# Patient Record
Sex: FEMALE | Race: WHITE | NOT HISPANIC OR LATINO | Employment: FULL TIME | URBAN - METROPOLITAN AREA
[De-identification: names, ages, dates, MRNs, and addresses within clinical notes are randomized per-mention and may not be internally consistent; named-entity substitution may affect disease eponyms.]

---

## 2017-05-12 ENCOUNTER — TRANSCRIBE ORDERS (OUTPATIENT)
Dept: LAB | Facility: HOSPITAL | Age: 52
End: 2017-05-12

## 2017-05-12 ENCOUNTER — APPOINTMENT (OUTPATIENT)
Dept: LAB | Facility: HOSPITAL | Age: 52
End: 2017-05-12
Attending: FAMILY MEDICINE
Payer: COMMERCIAL

## 2017-05-12 DIAGNOSIS — I10 ESSENTIAL HYPERTENSION, BENIGN: Primary | ICD-10-CM

## 2017-05-12 DIAGNOSIS — I10 ESSENTIAL HYPERTENSION, BENIGN: ICD-10-CM

## 2017-05-12 LAB
ALBUMIN SERPL BCP-MCNC: 3.8 G/DL (ref 3.5–5)
ALP SERPL-CCNC: 109 U/L (ref 46–116)
ALT SERPL W P-5'-P-CCNC: 11 U/L (ref 12–78)
ANION GAP SERPL CALCULATED.3IONS-SCNC: 6 MMOL/L (ref 4–13)
AST SERPL W P-5'-P-CCNC: 9 U/L (ref 5–45)
BILIRUB SERPL-MCNC: 0.46 MG/DL (ref 0.2–1)
BUN SERPL-MCNC: 16 MG/DL (ref 5–25)
CALCIUM SERPL-MCNC: 8.9 MG/DL (ref 8.3–10.1)
CHLORIDE SERPL-SCNC: 105 MMOL/L (ref 100–108)
CHOLEST SERPL-MCNC: 171 MG/DL (ref 50–200)
CO2 SERPL-SCNC: 27 MMOL/L (ref 21–32)
CREAT SERPL-MCNC: 0.7 MG/DL (ref 0.6–1.3)
ERYTHROCYTE [DISTWIDTH] IN BLOOD BY AUTOMATED COUNT: 12.8 % (ref 11.6–15.1)
GFR SERPL CREATININE-BSD FRML MDRD: >60 ML/MIN/1.73SQ M
GLUCOSE P FAST SERPL-MCNC: 113 MG/DL (ref 65–99)
HCT VFR BLD AUTO: 40.5 % (ref 34.8–46.1)
HDLC SERPL-MCNC: 59 MG/DL (ref 40–60)
HGB BLD-MCNC: 13.8 G/DL (ref 11.5–15.4)
LDLC SERPL CALC-MCNC: 90 MG/DL (ref 0–100)
MCH RBC QN AUTO: 27.7 PG (ref 26.8–34.3)
MCHC RBC AUTO-ENTMCNC: 34.1 G/DL (ref 31.4–37.4)
MCV RBC AUTO: 81 FL (ref 82–98)
PLATELET # BLD AUTO: 258 THOUSANDS/UL (ref 149–390)
PMV BLD AUTO: 10.9 FL (ref 8.9–12.7)
POTASSIUM SERPL-SCNC: 3.3 MMOL/L (ref 3.5–5.3)
PROT SERPL-MCNC: 7.5 G/DL (ref 6.4–8.2)
RBC # BLD AUTO: 4.99 MILLION/UL (ref 3.81–5.12)
SODIUM SERPL-SCNC: 138 MMOL/L (ref 136–145)
TRIGL SERPL-MCNC: 112 MG/DL
TSH SERPL DL<=0.05 MIU/L-ACNC: 1 UIU/ML (ref 0.36–3.74)
WBC # BLD AUTO: 6.26 THOUSAND/UL (ref 4.31–10.16)

## 2017-05-12 PROCEDURE — 85027 COMPLETE CBC AUTOMATED: CPT

## 2017-05-12 PROCEDURE — 80053 COMPREHEN METABOLIC PANEL: CPT

## 2017-05-12 PROCEDURE — 80061 LIPID PANEL: CPT

## 2017-05-12 PROCEDURE — 36415 COLL VENOUS BLD VENIPUNCTURE: CPT

## 2017-05-12 PROCEDURE — 84443 ASSAY THYROID STIM HORMONE: CPT

## 2017-05-14 ENCOUNTER — GENERIC CONVERSION - ENCOUNTER (OUTPATIENT)
Dept: OTHER | Facility: OTHER | Age: 52
End: 2017-05-14

## 2017-05-17 ENCOUNTER — ALLSCRIPTS OFFICE VISIT (OUTPATIENT)
Dept: OTHER | Facility: OTHER | Age: 52
End: 2017-05-17

## 2017-05-17 DIAGNOSIS — Z12.11 ENCOUNTER FOR SCREENING FOR MALIGNANT NEOPLASM OF COLON: ICD-10-CM

## 2017-05-17 DIAGNOSIS — R73.01 IMPAIRED FASTING GLUCOSE: ICD-10-CM

## 2018-01-09 NOTE — PROGRESS NOTES
Assessment    1  Encounter for mammogram to establish baseline mammogram (V76 12) (Z12 31)   2  Encounter for preventive health examination (V70 0) (Z00 00)   3  Hypertension (401 9) (I10)    Plan  Encounter for mammogram to establish baseline mammogram    · * MAMMO SCREENING BILATERAL W CAD; Status:Hold For - Scheduling; Requested  for:50Bqx0710;   Hypertension    · (Q) CBC (H/H, RBC, INDICES, WBC, PLT); Status:Active; Requested for:02Cyq1974;    · (Q) COMPREHENSIVE METABOLIC PNL W/ADJUSTED CALCIUM; Status:Active; Requested for:16Euf1235;    · (Q) LIPID PANEL WITH REFLEX TO DIRECT LDL; Status:Active; Requested  for:42Rdm0941;    · (Q) TSH, 3RD GENERATION; Status:Active; Requested for:56Qhh7612;     Discussion/Summary  health maintenance visit Currently, she eats a healthy diet and Not exercising  pap smear 3 years ago normal as per patient Breast cancer screening: the risks and benefits of breast cancer screening were discussed and mammogram has been ordered  Screening lab work includes hemoglobin, glucose, lipid profile and thyroid function testing  Advice and education were given regarding nutrition, aerobic exercise, weight loss, calcium supplements and vitamin D supplements  Patient discussion: discussed with the patient  53 yo female   HM:  - For breast cancer screening: Mammogram ordered  - BMI: 42 2: Patient counselled to reduce weight, eat healthy diet and do exercise  - Decreased Vision: Seeing her Opthalmology doctor  Hypertension:  - BP well controlled with Hydrochlorothiazide 12 5 mg oral once a day and Metoprolol Succinate ER 50 mg oral once a day  - Ordered CBC, CMP, lipid panel and TSH  - Will follow up in 1- 2 weeks for lab results  Possible side effects of new medications were reviewed with the patient/guardian today  The patient was counseled regarding instructions for management, risk factor reductions, importance of compliance with treatment       Self Referrals: No      Chief Complaint  Health maintenance      History of Present Illness  HM, Adult Female: The patient is being seen for a health maintenance evaluation  The last health maintenance visit was 1 year(s) ago  General Health: The patient's health since the last visit is described as good  She has regular dental visits  She complains of vision problems  She denies hearing loss  Lifestyle:  She does not exercise regularly  She does not use tobacco  She denies alcohol use  She denies drug use  Reproductive health:  she is not sexually active  LMP Aug 2,2016  Regular periods, No children  Screening:   HPI: 55 yo female came for trevon maintenance  She has no health concerns  She reported that she eats healthy diet, limited her soda intake, do not do exercises  Review of Systems    Constitutional: no fever, no chills and not feeling tired  Eyes: eyesight problems and Decreased vision, wears glasses  , but no eye pain and eyes not red  ENT: no earache, no nosebleeds, no sore throat, no hearing loss, no nasal discharge and no hoarseness  Cardiovascular: No complaints of slow heart rate, no fast heart rate, no chest pain, no palpitations, no leg claudication, no lower extremity edema  Respiratory: No complaints of shortness of breath, no wheezing, no cough, no SOB on exertion, no orthopnea, no PND  Gastrointestinal: No complaints of abdominal pain, no constipation, no nausea or vomiting, no diarrhea, no bloody stools  Genitourinary: No complaints of dysuria, no incontinence, no pelvic pain, no dysmenorrhea, no vaginal discharge or bleeding  Integumentary: No complaints of skin rash or lesions, no itching, no skin wounds, no breast pain or lump  Neurological: No complaints of headache, no confusion, no convulsions, no numbness, no dizziness or fainting, no tingling, no limb weakness, no difficulty walking  Psychiatric: no anxiety, no sleep disturbances, no depression and no emotional problems     Endocrine: No complaints of proptosis, no hot flashes, no muscle weakness, no deepening of the voice, no feelings of weakness  Hematologic/Lymphatic: No complaints of swollen glands, no swollen glands in the neck, does not bleed easily, does not bruise easily  Active Problems    1  Allergic rhinitis (477 9) (J30 9)   2  Cerumen impaction (380 4) (H61 20)   3  Hearing loss due to cerumen impaction (389 8,380 4) (H61 20)   4  Hypertension (401 9) (I10)   5  Obesity (278 00) (E66 9)   6  Pain, ear (388 70) (H92 09)   7  Right otitis media with effusion (381 4) (H65 91)   8  Screening for breast cancer (V76 10) (Z12 39)    Surgical History    · History of Knee Surgery    Family History  Mother    · Family history of Diabetes Mellitus (V18 0)  Father    · Family history of Hypertension (V17 49)    Social History    · Being A Social Drinker   · Never A Smoker   · Uses Safety Equipment - Seatbelts    Current Meds   1  Debrox 6 5 % Otic Solution; Instill 5-10 drops twice daily for up to 4 days; Therapy: 01Apr2015 to (Last Rx:01Apr2015)  Requested for: 21Cca1308 Ordered   2  Fluticasone Propionate 50 MCG/ACT Nasal Suspension; USE 2 SPRAYS IN EACH   NOSTRIL ONCE DAILY; Therapy: 45YTV8900 to (Last Rx:78Dfn2955)  Requested for: 51ABV4661 Ordered   3  Hydrochlorothiazide 12 5 MG Oral Tablet; TAKE 1 TABLET DAILY IN THE MORNING; Therapy: 33LLJ7521 to (96 [id number])  Requested for: 40JYQ1049; Last   Rx:23Jan2013 Ordered   4  Hydrochlorothiazide 12 5 MG Oral Tablet; TAKE 1 TABLET EVERY MORNING; Therapy: 45QJA9913 to (Last Rx:20Bia0659)  Requested for: 03Yqh0451 Ordered   5  Metoprolol Succinate ER 50 MG Oral Tablet Extended Release 24 Hour; TAKE 1 TABLET   EVERY MORNING; Therapy: 32EZK0535 to (Last Rx:39Deq5106)  Requested for: 34YLI8499 Ordered   6  Ofloxacin 0 3 % Otic Solution; instill 5 drops into the RIGHT ear 4 times daily for 5 days;    Therapy: 01Apr2015 to (Last Rx:01Apr2015)  Requested for: 01Apr2015 Ordered    Allergies    1  Azithromycin TABS   2  Penicillins    Vitals   Recorded: 43SHT2930 44:37XC   Systolic 420   Diastolic 86   Heart Rate 80   Respiration 16   Temperature 97 7 F   Height 5 ft 3 4 in   Weight 241 lb 4 oz   BMI Calculated 42 2   BSA Calculated 2 1     Physical Exam    Constitutional   General appearance: No acute distress, well appearing and well nourished  Head and Face   Head and face: Normal     Palpation of the face and sinuses: No sinus tenderness  Eyes   Conjunctiva and lids: No swelling, erythema or discharge  Pupils and irises: Equal, round, reactive to light  Ears, Nose, Mouth, and Throat   External inspection of ears and nose: Normal     Nasal mucosa, septum, and turbinates: Normal without edema or erythema  Oropharynx: Normal with no erythema, edema, exudate or lesions  Neck   Neck: Supple, symmetric, trachea midline, no masses  Pulmonary   Respiratory effort: No increased work of breathing or signs of respiratory distress  Auscultation of lungs: Clear to auscultation  Cardiovascular   Auscultation of heart: Normal rate and rhythm, normal S1 and S2, no murmurs  Examination of extremities for edema and/or varicosities: Normal     Abdomen   Abdomen: Non-tender, no masses  Liver and spleen: No hepatomegaly or splenomegaly  Musculoskeletal   Gait and station: Normal     Digits and nails: Normal without clubbing or cyanosis  Skin   Skin and subcutaneous tissue: Normal without rashes or lesions  Neurologic   Cranial nerves: Cranial nerves II-XII intact  Coordination: Normal finger to nose and heel to shin  Psychiatric   Judgment and insight: Normal     Orientation to person, place, and time: Normal     Recent and remote memory: Intact      Mood and affect: Normal        Attending Note  Attending Note: Attending Note: I discussed the case with the Resident and reviewed the Resident's note, I supervised the Resident and I agree with the Resident management plan as it was presented to me  Level of Participation: I was present in clinic and examined the patient  Comments/Additional Findings: HTN: well controlled, agree with labs, TLC, medications  RHM: mammogram  I agree with the Resident's note  Future Appointments    Date/Time Provider Specialty Site   09/28/2016 04:00 PM SEDRICK Carballo   [id number] [facility]     Signatures   Electronically signed by : SEDRICK Yañez ; Aug 10 2016  7:34PM EST                       (Author)    Electronically signed by : SEDRICK Jones ; Aug 11 2016  2:04PM EST                       (Author)

## 2018-01-10 NOTE — RESULT NOTES
Verified Results  (1) CBC/ PLT (NO DIFF) 11EGI9940 08:39AM Miguelito Brooks     Test Name Result Flag Reference   HEMATOCRIT 40 5 %  34 8-46 1   HEMOGLOBIN 13 8 g/dL  11 5-15 4   MCHC 34 1 g/dL  31 4-37 4   MCH 27 7 pg  26 8-34 3   MCV 81 fL L 82-98   PLATELET COUNT 270 Thousands/uL  149-390   RBC COUNT 4 99 Million/uL  3 81-5 12   RDW 12 8 %  11 6-15 1   WBC COUNT 6 26 Thousand/uL  4 31-10 16   MPV 10 9 fL  8 9-12 7     (1) COMPREHENSIVE METABOLIC PANEL 60BFQ3041 45:92TE Miguelito Brooks     Test Name Result Flag Reference   SODIUM 138 mmol/L  136-145   POTASSIUM 3 3 mmol/L L 3 5-5 3   CHLORIDE 105 mmol/L  100-108   CARBON DIOXIDE 27 mmol/L  21-32   ANION GAP (CALC) 6 mmol/L  4-13   BLOOD UREA NITROGEN 16 mg/dL  5-25   CREATININE 0 70 mg/dL  0 60-1 30   Standardized to IDMS reference method   CALCIUM 8 9 mg/dL  8 3-10 1   BILI, TOTAL 0 46 mg/dL  0 20-1 00   ALK PHOSPHATAS 109 U/L     ALT (SGPT) 11 U/L L 12-78   AST(SGOT) 9 U/L  5-45   ALBUMIN 3 8 g/dL  3 5-5 0   TOTAL PROTEIN 7 5 g/dL  6 4-8 2   eGFR Non-African American      >60 0 ml/min/1 73sq [facility] Disease Education Program recommendations are as follows:  GFR calculation is accurate only with a steady state creatinine  Chronic Kidney disease less than 60 ml/min/1 73 sq  meters  Kidney failure less than 15 ml/min/1 73 sq  meters  GLUCOSE FASTING 113 mg/dL H 65-99     (1) TSH 97SAQ2469 08:39AM Miguelito Brooks     Test Name Result Flag Reference   TSH 1 000 uIU/mL  0 358-3 740   This bloodwork is non-fasting  Please drink two glasses of water morning of  bloodwork  Patients undergoing fluorescein dye angiography may retain small amounts of fluorescein in the body for 48-72 hours post procedure  Samples containing fluorescein can produce falsely depressed TSH values  If the patient had this procedure,a specimen should be resubmitted post fluorescein clearance            The recommended reference ranges for TSH during pregnancy are as follows:  First trimester 0 1 to 2 5 uIU/mL  Second trimester  0 2 to 3 0 uIU/mL  Third trimester 0 3 to 3 0 uIU/m     (1) LIPID PANEL FASTING W DIRECT LDL REFLEX 12EYF1299 08:39AM Za Steele     Test Name Result Flag Reference   CHOLESTEROL 171 mg/dL     LDL CHOLESTEROL CALCULATED 90 mg/dL  0-100   This is a fasting blood test  Water,black tea or black  coffee only after 9:00pm the night before test  Drink 2 glasses of water the morning of test         Triglyceride:         Normal              <150 mg/dl       Borderline High    150-199 mg/dl       High               200-499 mg/dl       Very High          >499 mg/dl  Cholesterol:         Desirable        <200 mg/dl      Borderline High  200-239 mg/dl      High             >239 mg/dl  HDL Cholesterol:        High    >59 mg/dL      Low     <41 mg/dL  LDL Cholesterol:        Optimal          <100 mg/dl        Near Optimal     100-129 mg/dl        Above Optimal          Borderline High   130-159 mg/dl          High              160-189 mg/dl          Very High        >189 mg/dl  LDL CALCULATED:    This screening LDL is a calculated result  It does not have the accuracy of the Direct Measured LDL in the monitoring of patients with hyperlipidemia and/or statin therapy  Direct Measure LDL (RGB493) must be ordered separately in these patients  TRIGLYCERIDES 112 mg/dL  <=150   Specimen collection should occur prior to N-Acetylcysteine or Metamizole administration due to the potential for falsely depressed results  HDL,DIRECT 59 mg/dL  40-60   Specimen collection should occur prior to Metamizole administration due to the potential for falsely depressed results

## 2018-01-13 VITALS
HEIGHT: 63 IN | WEIGHT: 239.38 LBS | SYSTOLIC BLOOD PRESSURE: 124 MMHG | BODY MASS INDEX: 42.41 KG/M2 | HEART RATE: 84 BPM | RESPIRATION RATE: 14 BRPM | DIASTOLIC BLOOD PRESSURE: 80 MMHG | TEMPERATURE: 97 F

## 2018-02-05 DIAGNOSIS — I10 HYPERTENSION, UNSPECIFIED TYPE: Primary | ICD-10-CM

## 2018-02-05 RX ORDER — METOPROLOL SUCCINATE 50 MG/1
1 TABLET, EXTENDED RELEASE ORAL DAILY
COMMUNITY
Start: 2011-06-22 | End: 2018-02-05 | Stop reason: SDUPTHER

## 2018-02-05 RX ORDER — METOPROLOL SUCCINATE 50 MG/1
TABLET, EXTENDED RELEASE ORAL
Qty: 90 TABLET | Refills: 0 | OUTPATIENT
Start: 2018-02-05

## 2018-02-05 RX ORDER — METOPROLOL SUCCINATE 50 MG/1
50 TABLET, EXTENDED RELEASE ORAL DAILY
Qty: 30 TABLET | Refills: 0 | Status: SHIPPED | OUTPATIENT
Start: 2018-02-05 | End: 2018-05-15 | Stop reason: SDUPTHER

## 2018-02-05 NOTE — TELEPHONE ENCOUNTER
Received request for refill of pt's Metoprolol  Will send a one month supply, but pt needs to set up an appointment, as she has not been seen in over 6 months

## 2018-02-06 NOTE — TELEPHONE ENCOUNTER
Left patient a message letting her know refill was sent to pharmacy and asked her to call to schedule appointment

## 2018-05-14 PROBLEM — R73.01 IMPAIRED FASTING GLUCOSE: Status: ACTIVE | Noted: 2017-05-17

## 2018-05-14 RX ORDER — HYDROCHLOROTHIAZIDE 12.5 MG/1
1 TABLET ORAL DAILY
COMMUNITY
Start: 2011-11-21 | End: 2018-05-14 | Stop reason: SDUPTHER

## 2018-05-14 RX ORDER — HYDROCHLOROTHIAZIDE 12.5 MG/1
TABLET ORAL
Qty: 90 TABLET | Refills: 3 | OUTPATIENT
Start: 2018-05-14

## 2018-05-14 NOTE — TELEPHONE ENCOUNTER
Patient has not been seen in our office in a year  Please have her schedule visit  If she is completely out of Rx please let me know and I will provide 30 day supply

## 2018-05-14 NOTE — PROGRESS NOTES
Afia Caban 1965 female MRN: [id number]    Family Medicine Follow-up Visit      SUBJECTIVE    CC: Hypertension    HPI:  Afia Caban is a 48 y o  female who presented for a follow-up of Hypertension   This is a chronic problem  The current episode started more than 1 year ago  The problem is unchanged  The problem is controlled  Pertinent negatives include no anxiety, blurred vision, chest pain, headaches, malaise/fatigue, orthopnea, palpitations, peripheral edema, shortness of breath or sweats  There are no associated agents to hypertension  Risk factors for coronary artery disease include obesity and sedentary lifestyle  Past treatments include diuretics and beta blockers  The current treatment provides significant improvement  Compliance problems include diet and exercise  There is no history of angina, kidney disease or heart failure  Never mammo, Had PAP never abnormal     Review of Systems   Constitutional: Negative for activity change, chills, fever and malaise/fatigue  HENT: Negative for congestion, rhinorrhea and sore throat  Eyes: Negative for blurred vision and visual disturbance  Respiratory: Negative for cough, shortness of breath and wheezing  Cardiovascular: Negative for chest pain, palpitations and orthopnea  Gastrointestinal: Negative for abdominal pain, blood in stool, constipation, diarrhea, nausea and vomiting  Genitourinary: Negative for dysuria  Musculoskeletal: Negative for arthralgias and myalgias  Skin: Negative for rash  Neurological: Negative for dizziness, weakness and headaches  All other systems reviewed and are negative  Historical Information     The patient history was reviewed as follows:    No past medical history on file    Past Surgical History:   Procedure Laterality Date    KNEE SURGERY       Family History   Problem Relation Age of Onset    Diabetes Mother      Mellitus    Hypertension Father       Social History   History   Alcohol Use  Yes     Comment: Social     History   Drug use: Unknown     History   Smoking Status    Never Smoker   Smokeless Tobacco    Not on file     Medications:   Meds/Allergies     Current Outpatient Prescriptions:     hydrochlorothiazide (HYDRODIURIL) 12 5 mg tablet, Take 1 tablet (12 5 mg total) by mouth daily, Disp: 90 tablet, Rfl: 3    metoprolol succinate (TOPROL-XL) 50 mg 24 hr tablet, Take 1 tablet (50 mg total) by mouth daily, Disp: 90 tablet, Rfl: 3  Allergies   Allergen Reactions    Azithromycin     Penicillins        OBJECTIVE    Vitals:   Vitals:    05/15/18 1443   BP: 126/84   Pulse: 84   Resp: 16   Temp: 97 7 °F (36 5 °C)   Weight: 109 kg (240 lb 9 6 oz)   Height: 5' 4 4" (1 636 m)     Wt Readings from Last 3 Encounters:   05/15/18 109 kg (240 lb 9 6 oz)   05/17/17 109 kg (239 lb 6 oz)   08/10/16 109 kg (241 lb 4 oz)     Body mass index is 40 79 kg/m²  Temp Readings from Last 3 Encounters:   05/15/18 97 7 °F (36 5 °C)   05/17/17 (!) 97 °F (36 1 °C)   08/10/16 97 7 °F (36 5 °C)     BP Readings from Last 3 Encounters:   05/15/18 126/84   05/17/17 124/80   08/10/16 130/86     Pulse Readings from Last 3 Encounters:   05/15/18 84   05/17/17 84   08/10/16 80     No LMP recorded  Physical Exam:    Physical Exam   Constitutional: She appears well-developed and well-nourished  HENT:   Head: Normocephalic and atraumatic  Eyes: Conjunctivae and EOM are normal    Neck: No thyromegaly present  Cardiovascular: Normal rate, regular rhythm, normal heart sounds and intact distal pulses  No murmur heard  Pulmonary/Chest: Effort normal and breath sounds normal  No respiratory distress  She has no wheezes  Abdominal: Soft  Bowel sounds are normal  She exhibits no distension  There is no tenderness  Neurological: She is alert  Skin: Skin is warm and dry  Psychiatric: She has a normal mood and affect  Her behavior is normal    Nursing note and vitals reviewed  Labs:   I have personally reviewed all pertinent results  Imaging:  I have personally reviewed all pertinent results  Assessment/Plan   Hypertension  At goal <150/90  Continue HCTZ 12 5mg QD, metoprolol succinate 50mg QD    Marvin Quintero was seen today for hypertension  Diagnoses and all orders for this visit:    Essential hypertension  -     Discontinue: hydrochlorothiazide (HYDRODIURIL) 12 5 mg tablet; Take 1 tablet (12 5 mg total) by mouth daily  -     hydrochlorothiazide (HYDRODIURIL) 12 5 mg tablet; Take 1 tablet (12 5 mg total) by mouth daily    Breast cancer screening by mammogram  -     Mammo screening bilateral w cad; Future    Hypertension, unspecified type  -     metoprolol succinate (TOPROL-XL) 50 mg 24 hr tablet; Take 1 tablet (50 mg total) by mouth daily    Screening for colon cancer  -     Ambulatory referral to Gastroenterology; Future      Patient defers health maintenance until January when she can take off from work  Will do blood work, PAP, mammo, colonoscopy at that time  - PCP: Lisandra Canseco MD  - Follow-up appointments:     No future appointments      _____________________________________________________________________   The attending physician, Dr Tai Rodriguez, agreed with the plan      Dorothy Rainey MD, PGY-2  [facility] Medicine   5/15/2018

## 2018-05-15 ENCOUNTER — OFFICE VISIT (OUTPATIENT)
Dept: FAMILY MEDICINE CLINIC | Facility: CLINIC | Age: 53
End: 2018-05-15
Payer: COMMERCIAL

## 2018-05-15 VITALS
SYSTOLIC BLOOD PRESSURE: 126 MMHG | WEIGHT: 240.6 LBS | RESPIRATION RATE: 16 BRPM | HEART RATE: 84 BPM | TEMPERATURE: 97.7 F | HEIGHT: 64 IN | DIASTOLIC BLOOD PRESSURE: 84 MMHG | BODY MASS INDEX: 41.07 KG/M2

## 2018-05-15 DIAGNOSIS — I10 HYPERTENSION, UNSPECIFIED TYPE: ICD-10-CM

## 2018-05-15 DIAGNOSIS — Z12.11 SCREENING FOR COLON CANCER: ICD-10-CM

## 2018-05-15 DIAGNOSIS — Z12.31 BREAST CANCER SCREENING BY MAMMOGRAM: ICD-10-CM

## 2018-05-15 DIAGNOSIS — I10 ESSENTIAL HYPERTENSION: Primary | ICD-10-CM

## 2018-05-15 PROCEDURE — 3074F SYST BP LT 130 MM HG: CPT | Performed by: FAMILY MEDICINE

## 2018-05-15 PROCEDURE — 99213 OFFICE O/P EST LOW 20 MIN: CPT | Performed by: FAMILY MEDICINE

## 2018-05-15 PROCEDURE — 3008F BODY MASS INDEX DOCD: CPT | Performed by: FAMILY MEDICINE

## 2018-05-15 PROCEDURE — 3079F DIAST BP 80-89 MM HG: CPT | Performed by: FAMILY MEDICINE

## 2018-05-15 RX ORDER — HYDROCHLOROTHIAZIDE 12.5 MG/1
12.5 TABLET ORAL DAILY
Qty: 90 TABLET | Refills: 3 | Status: SHIPPED | OUTPATIENT
Start: 2018-05-15 | End: 2018-05-15 | Stop reason: SDUPTHER

## 2018-05-15 RX ORDER — HYDROCHLOROTHIAZIDE 12.5 MG/1
12.5 TABLET ORAL DAILY
Qty: 90 TABLET | Refills: 3 | Status: SHIPPED | OUTPATIENT
Start: 2018-05-15 | End: 2018-05-16 | Stop reason: SDUPTHER

## 2018-05-15 RX ORDER — METOPROLOL SUCCINATE 50 MG/1
50 TABLET, EXTENDED RELEASE ORAL DAILY
Qty: 90 TABLET | Refills: 3 | Status: SHIPPED | OUTPATIENT
Start: 2018-05-15 | End: 2018-05-16 | Stop reason: SDUPTHER

## 2018-05-15 NOTE — PATIENT INSTRUCTIONS

## 2018-05-16 DIAGNOSIS — I10 ESSENTIAL HYPERTENSION: ICD-10-CM

## 2018-05-16 DIAGNOSIS — I10 HYPERTENSION, UNSPECIFIED TYPE: ICD-10-CM

## 2018-05-16 RX ORDER — METOPROLOL SUCCINATE 50 MG/1
50 TABLET, EXTENDED RELEASE ORAL DAILY
Qty: 90 TABLET | Refills: 1 | Status: SHIPPED | OUTPATIENT
Start: 2018-05-16 | End: 2019-06-11 | Stop reason: SDUPTHER

## 2018-05-16 RX ORDER — HYDROCHLOROTHIAZIDE 12.5 MG/1
12.5 TABLET ORAL DAILY
Qty: 90 TABLET | Refills: 1 | Status: SHIPPED | OUTPATIENT
Start: 2018-05-16 | End: 2019-06-11 | Stop reason: SDUPTHER

## 2019-02-21 ENCOUNTER — TELEPHONE (OUTPATIENT)
Dept: FAMILY MEDICINE CLINIC | Facility: CLINIC | Age: 54
End: 2019-02-21

## 2019-02-21 NOTE — TELEPHONE ENCOUNTER
Patient was last seen 5/15/2018 and was to follow up in 6 months for a pap smear  Please schedule appointment with Dr Kathy Bunn  Thank you

## 2019-05-12 DIAGNOSIS — I10 ESSENTIAL HYPERTENSION: ICD-10-CM

## 2019-05-12 DIAGNOSIS — I10 HYPERTENSION, UNSPECIFIED TYPE: ICD-10-CM

## 2019-05-13 RX ORDER — HYDROCHLOROTHIAZIDE 12.5 MG/1
TABLET ORAL
Qty: 30 TABLET | Refills: 0 | Status: SHIPPED | OUTPATIENT
Start: 2019-05-13 | End: 2019-06-11 | Stop reason: SDUPTHER

## 2019-05-13 RX ORDER — METOPROLOL SUCCINATE 50 MG/1
TABLET, EXTENDED RELEASE ORAL
Qty: 30 TABLET | Refills: 0 | Status: SHIPPED | OUTPATIENT
Start: 2019-05-13 | End: 2019-06-11 | Stop reason: SDUPTHER

## 2019-06-10 ENCOUNTER — OFFICE VISIT (OUTPATIENT)
Dept: FAMILY MEDICINE CLINIC | Facility: CLINIC | Age: 54
End: 2019-06-10

## 2019-06-10 VITALS
DIASTOLIC BLOOD PRESSURE: 92 MMHG | SYSTOLIC BLOOD PRESSURE: 140 MMHG | RESPIRATION RATE: 18 BRPM | BODY MASS INDEX: 44.54 KG/M2 | WEIGHT: 251.4 LBS | TEMPERATURE: 97.8 F | HEIGHT: 63 IN | HEART RATE: 76 BPM

## 2019-06-10 DIAGNOSIS — R73.01 IMPAIRED FASTING GLUCOSE: ICD-10-CM

## 2019-06-10 DIAGNOSIS — Z12.12 SCREENING FOR COLORECTAL CANCER: ICD-10-CM

## 2019-06-10 DIAGNOSIS — E66.01 MORBID OBESITY WITH BMI OF 40.0-44.9, ADULT (HCC): ICD-10-CM

## 2019-06-10 DIAGNOSIS — I10 ESSENTIAL HYPERTENSION: ICD-10-CM

## 2019-06-10 DIAGNOSIS — Z11.4 SCREENING FOR HIV (HUMAN IMMUNODEFICIENCY VIRUS): ICD-10-CM

## 2019-06-10 DIAGNOSIS — Z11.59 NEED FOR HEPATITIS C SCREENING TEST: ICD-10-CM

## 2019-06-10 DIAGNOSIS — Z12.31 ENCOUNTER FOR SCREENING MAMMOGRAM FOR BREAST CANCER: ICD-10-CM

## 2019-06-10 DIAGNOSIS — Z12.11 SCREENING FOR COLORECTAL CANCER: ICD-10-CM

## 2019-06-10 DIAGNOSIS — Z00.00 ANNUAL PHYSICAL EXAM: Primary | ICD-10-CM

## 2019-06-10 PROCEDURE — 99396 PREV VISIT EST AGE 40-64: CPT | Performed by: FAMILY MEDICINE

## 2019-06-11 ENCOUNTER — TELEPHONE (OUTPATIENT)
Dept: FAMILY MEDICINE CLINIC | Facility: CLINIC | Age: 54
End: 2019-06-11

## 2019-06-11 DIAGNOSIS — I10 ESSENTIAL HYPERTENSION: ICD-10-CM

## 2019-06-11 DIAGNOSIS — I10 HYPERTENSION, UNSPECIFIED TYPE: ICD-10-CM

## 2019-06-11 RX ORDER — METOPROLOL SUCCINATE 50 MG/1
50 TABLET, EXTENDED RELEASE ORAL DAILY
Qty: 90 TABLET | Refills: 3 | Status: SHIPPED | OUTPATIENT
Start: 2019-06-11 | End: 2020-06-05

## 2019-06-11 RX ORDER — HYDROCHLOROTHIAZIDE 12.5 MG/1
12.5 TABLET ORAL DAILY
Qty: 90 TABLET | Refills: 3 | Status: SHIPPED | OUTPATIENT
Start: 2019-06-11 | End: 2020-06-05

## 2020-06-05 DIAGNOSIS — I10 ESSENTIAL HYPERTENSION: ICD-10-CM

## 2020-06-05 DIAGNOSIS — I10 HYPERTENSION, UNSPECIFIED TYPE: ICD-10-CM

## 2020-06-05 RX ORDER — HYDROCHLOROTHIAZIDE 12.5 MG/1
TABLET ORAL
Qty: 90 TABLET | Refills: 0 | Status: SHIPPED | OUTPATIENT
Start: 2020-06-05 | End: 2020-09-03

## 2020-06-05 RX ORDER — METOPROLOL SUCCINATE 50 MG/1
TABLET, EXTENDED RELEASE ORAL
Qty: 90 TABLET | Refills: 0 | Status: SHIPPED | OUTPATIENT
Start: 2020-06-05 | End: 2020-09-03

## 2020-09-03 DIAGNOSIS — I10 HYPERTENSION, UNSPECIFIED TYPE: ICD-10-CM

## 2020-09-03 DIAGNOSIS — I10 ESSENTIAL HYPERTENSION: ICD-10-CM

## 2020-09-03 RX ORDER — HYDROCHLOROTHIAZIDE 12.5 MG/1
12.5 TABLET ORAL DAILY
Qty: 90 TABLET | Refills: 0 | Status: SHIPPED | OUTPATIENT
Start: 2020-09-03 | End: 2020-12-02

## 2020-09-03 RX ORDER — METOPROLOL SUCCINATE 50 MG/1
50 TABLET, EXTENDED RELEASE ORAL DAILY
Qty: 90 TABLET | Refills: 0 | Status: SHIPPED | OUTPATIENT
Start: 2020-09-03 | End: 2020-12-02

## 2020-09-11 NOTE — PROGRESS NOTES
Assessment/Plan:    Hypertension  - Essential HTN  - BP today 150/102, repeat 150/96  - Home meds: HCTZ 12 5, Toprol XL 50 mg daily  - Body mass index is 45 53 kg/m²  - Last basic metabolic panel , urine protein/ microalbumin: from 2017, K 3 3 at that time, sCr wnl,no microalbumin ratio   - Counseled about diet and salt intake, encouraged exercise daily  - Discussed potential need to change her HCTZ or add potassium supplementation if her K+ level is still low  - Pt will add more potassium rich foods into her diet over the next couple of weeks  - Will order BMP and follow-up in 2 weeks to go over blood work and perform her annual physical exam      Leg cramping  Chronic, worsening  - Pt reports h/o leg cramps, getting worse now and she has been working more in the kitchen lately  - Cramping occurs nightly and sometimes after sitting for prolonged periods of time  - Could be 2/2 dehydration vs electrolyte disturbances from HCTZ use vs prolonged muscle use from standing  - Will check Bmp and Magnesium level    - Follow-up in 2 weeks at annual physical exam    Morbid obesity with BMI of 40 0-44 9, adult ([facility] [address] )  BMI Counseling: Body mass index is 45 53 kg/m²  The BMI is above normal  Nutrition recommendations include reducing portion sizes, decreasing overall calorie intake and 3-5 servings of fruits/vegetables daily  Exercise recommendations include exercising 3-5 times per week  - We discussed increasing walking on her days off to 1-2 miles or 30-60 minutes of physical activity  - Further nutrition recs: stop drinking soda daily, increase water intake, and monitor salt content of other beverages         Diagnoses and all orders for this visit:    Essential hypertension  -     Basic metabolic panel; Future  -     Magnesium; Future    Encounter for screening colonoscopy  -     Ambulatory referral for colonoscopy;  Future    Encounter for immunization  -     influenza vaccine, quadrivalent, recombinant, PF, 0 5 mL, for patients 18 yr+ (FLUBLOK)    Morbid obesity with BMI of 40 0-44 9, adult ([facility])    Subjective:      Patient ID: Yaw Lawler is a 50 y o  female  54yo f with history of HTN, previously uncontrolled, she reports she has been doing well since her visit one year ago  She has had no hospitalizations and only had one minor cold  She works in the kitchen/cafeteria at [employer]  She denies headaches blurry vision  She endorses palpitations when she is stressed out but does not have palpitations otherwise  She is having no problems with urinating frequently or any abdominal pain  She feels like some times she goes to the bathroom multiple times at night but not often  She denies fatigue, cold or heat intolerance  She endorses gaining some weight during COVID, but is actively working to lose some weight  She used to walk but lately has been working a lot  She reports drinking soda daily, some water, and  selzer/sparkling water  She eats 2-3 times daily and eats vegetables and fruits  She has been getting leg cramps sometimes after sitting for long periods of time and at night time  Stretching helps with drinking some club soda or a gatorade, but she reports they have been getting worse recently since she has been working more  The following portions of the patient's history were reviewed and updated as appropriate: allergies, current medications, past family history, past medical history, past social history, past surgical history and problem list     Review of Systems   Constitutional: Negative for activity change, appetite change, chills, fatigue, fever and unexpected weight change  HENT: Negative for congestion, rhinorrhea and sneezing  Eyes: Negative  Respiratory: Negative for cough and shortness of breath  Cardiovascular: Negative for chest pain, palpitations and leg swelling  Gastrointestinal: Negative for abdominal distention and abdominal pain     Endocrine: Negative for cold intolerance and heat intolerance  Genitourinary: Negative for dysuria, frequency, pelvic pain and urgency  Musculoskeletal: Negative for arthralgias and back pain  Skin: Negative  Allergic/Immunologic: Negative for environmental allergies and food allergies  Neurological: Negative for dizziness, tremors, weakness, light-headedness, numbness and headaches  Psychiatric/Behavioral: Negative for agitation and behavioral problems  The patient is not nervous/anxious  Objective:      /96 (BP Location: Right arm, Patient Position: Sitting, Cuff Size: Large)   Pulse 88   Temp (!) 96 5 °F (35 8 °C) (Tympanic)   Resp 16   Wt 117 kg (257 lb)   BMI 45 53 kg/m²          Physical Exam  Constitutional:       Appearance: Normal appearance  She is obese  HENT:      Head: Normocephalic and atraumatic  Mouth/Throat:      Mouth: Mucous membranes are moist       Pharynx: Oropharynx is clear  Eyes:      Extraocular Movements: Extraocular movements intact  Conjunctiva/sclera: Conjunctivae normal    Cardiovascular:      Rate and Rhythm: Normal rate and regular rhythm  Pulses: Normal pulses  Heart sounds: Normal heart sounds  Pulmonary:      Effort: Pulmonary effort is normal       Breath sounds: Normal breath sounds  Abdominal:      General: Bowel sounds are normal    Musculoskeletal: Normal range of motion  General: No swelling  Skin:     General: Skin is warm and dry  Capillary Refill: Capillary refill takes less than 2 seconds  Neurological:      General: No focal deficit present  Mental Status: She is alert and oriented to person, place, and time  Mental status is at baseline     Psychiatric:         Mood and Affect: Mood normal          Behavior: Behavior normal

## 2020-09-14 ENCOUNTER — OFFICE VISIT (OUTPATIENT)
Dept: FAMILY MEDICINE CLINIC | Facility: CLINIC | Age: 55
End: 2020-09-14

## 2020-09-14 VITALS
BODY MASS INDEX: 45.53 KG/M2 | RESPIRATION RATE: 16 BRPM | WEIGHT: 257 LBS | DIASTOLIC BLOOD PRESSURE: 96 MMHG | HEART RATE: 88 BPM | SYSTOLIC BLOOD PRESSURE: 150 MMHG | TEMPERATURE: 96.5 F

## 2020-09-14 DIAGNOSIS — Z12.39 SCREENING FOR BREAST CANCER: ICD-10-CM

## 2020-09-14 DIAGNOSIS — Z11.4 SCREENING FOR HIV (HUMAN IMMUNODEFICIENCY VIRUS): ICD-10-CM

## 2020-09-14 DIAGNOSIS — I10 ESSENTIAL HYPERTENSION: Primary | ICD-10-CM

## 2020-09-14 DIAGNOSIS — E66.01 MORBID OBESITY WITH BMI OF 40.0-44.9, ADULT (HCC): ICD-10-CM

## 2020-09-14 DIAGNOSIS — Z23 ENCOUNTER FOR IMMUNIZATION: ICD-10-CM

## 2020-09-14 DIAGNOSIS — Z12.11 ENCOUNTER FOR SCREENING COLONOSCOPY: ICD-10-CM

## 2020-09-14 PROBLEM — R25.2 LEG CRAMPING: Status: ACTIVE | Noted: 2020-09-14

## 2020-09-14 PROCEDURE — 99213 OFFICE O/P EST LOW 20 MIN: CPT | Performed by: FAMILY MEDICINE

## 2020-09-14 PROCEDURE — 90682 RIV4 VACC RECOMBINANT DNA IM: CPT | Performed by: FAMILY MEDICINE

## 2020-09-14 PROCEDURE — 3725F SCREEN DEPRESSION PERFORMED: CPT | Performed by: FAMILY MEDICINE

## 2020-09-14 PROCEDURE — 1036F TOBACCO NON-USER: CPT | Performed by: FAMILY MEDICINE

## 2020-09-14 PROCEDURE — 90471 IMMUNIZATION ADMIN: CPT | Performed by: FAMILY MEDICINE

## 2020-09-14 NOTE — ASSESSMENT & PLAN NOTE
BMI Counseling: Body mass index is 45 53 kg/m²  The BMI is above normal  Nutrition recommendations include reducing portion sizes, decreasing overall calorie intake and 3-5 servings of fruits/vegetables daily  Exercise recommendations include exercising 3-5 times per week     - We discussed increasing walking on her days off to 1-2 miles or 30-60 minutes of physical activity  - Further nutrition recs: stop drinking soda daily, increase water intake, and monitor salt content of other beverages

## 2020-09-14 NOTE — ASSESSMENT & PLAN NOTE
Chronic, worsening  - Pt reports h/o leg cramps, getting worse now and she has been working more in the kitchen lately  - Cramping occurs nightly and sometimes after sitting for prolonged periods of time  - Could be 2/2 dehydration vs electrolyte disturbances from HCTZ use vs prolonged muscle use from standing  - Will check Bmp and Magnesium level    - Follow-up in 2 weeks at annual physical exam

## 2020-09-14 NOTE — ASSESSMENT & PLAN NOTE
- Essential HTN  - BP today 150/102, repeat 150/96  - Home meds: HCTZ 12 5, Toprol XL 50 mg daily  - Body mass index is 45 53 kg/m²    - Last basic metabolic panel , urine protein/ microalbumin: from 2017, K 3 3 at that time, sCr wnl,no microalbumin ratio   - Counseled about diet and salt intake, encouraged exercise daily  - Discussed potential need to change her HCTZ or add potassium supplementation if her K+ level is still low  - Pt will add more potassium rich foods into her diet over the next couple of weeks  - Will order BMP and follow-up in 2 weeks to go over blood work and perform her annual physical exam

## 2020-09-14 NOTE — PATIENT INSTRUCTIONS
Potassium Content of Foods List   WHAT YOU NEED TO KNOW:   Potassium is a mineral that is found in most foods  Potassium helps to balance fluids and minerals in your body  It also helps your body maintain a normal blood pressure  Potassium helps your muscles contract and your nerves function normally  You may need to increase or decrease potassium if you have certain health conditions  DISCHARGE INSTRUCTIONS:   Why you may need to change the amount of potassium you eat:   · You may need more potassium  if you have hypokalemia (low potassium levels) or high blood pressure  You may also need more potassium if you are taking diuretics  Diuretics and certain medicines cause your body to lose potassium  · You may need less potassium  in your diet if you have hyperkalemia (high potassium levels) or kidney disease  Potassium content of fruit:  The amount of potassium in milligrams (mg) contained in each fruit or serving of fruit is listed beside the item    · High-potassium foods (more than 200 mg per serving):      ¨ 1 medium banana (425)    ¨ ½ of a papaya (390)    ¨ ½ cup of prune juice (370)    ¨ ¼ cup of raisins (270)    ¨ 1 medium kamla (325) or kiwi (240)    ¨ 1 small orange (240) or ½ cup of orange juice (235)    ¨ ½ cup of cubed cantaloupe (215) or diced honeydew melon (200)    ¨ 1 medium pear (200)    · Medium-potassium foods (50 to 200 mg per serving):      ¨ 1 medium peach (185)    ¨ 1 small apple or ½ cup of apple juice (150)    ¨ ½ cup of peaches canned in juice (120)    ¨ ½ cup of canned pineapple (100)    ¨ ½ cup of fresh, sliced strawberries (645)    ¨ ½ cup of watermelon (85)    · Low-potassium foods (less than 50 mg per serving):      ¨ ½ cup of cranberries (45) or cranberry juice cocktail (20)    ¨ ½ cup of nectar of papaya, kamla, or pear (35)  Potassium content of vegetables:   · High-potassium foods (more than 200 mg per serving):      ¨ 1 medium baked potato, with skin (925)    ¨ 1 baked medium sweet potato, with skin (450)    ¨ ½ cup of tomato or vegetable juice (275), or 1 medium raw tomato (290)    ¨ ½ cup of mushrooms (280)    ¨ ½ cup of fresh brussels sprouts (250)    ¨ ½ cup of cooked zucchini (220) or winter squash (250)    ¨ ¼ of a medium avocado (245)    ¨ ½ cup of broccoli (230)    · Medium-potassium foods (50 to 200 mg per serving):      ¨ ½ cup of corn (195)    ¨ ½ cup of fresh or cooked carrots (180)    ¨ ½ cup of fresh cauliflower (150)    ¨ ½ cup of asparagus (155)    ¨ ½ cup of canned peas (90)     ¨ 1 cup of lettuce, all types (100)    ¨ ½ cup of fresh green beans (90)    ¨ ½ cup of frozen green beans (85)    ¨ ½ cup of cucumber (80)  Potassium content of protein foods:   · High-potassium foods (more than 200 mg per serving):      ¨ ½ cup of cooked natarajan beans (400) or lentils (365)    ¨ 1 cup of soy milk (300)    ¨ 3 ounces of baked or broiled salmon (319)    ¨ 3 ounces of roasted turkey, dark meat (250)    ¨ ¼ cup of sunflower seeds (241)    ¨ 3 ounces of cooked lean beef (224)    ¨ 2 tablespoons of smooth peanut butter (210)    · Medium-potassium foods (50 to 200 mg per serving):      ¨ 1 ounce of salted peanuts, almonds, or cashews (200)    ¨ 1 large egg (60 mg)  Potassium content of dairy foods:   · High-potassium foods (more than 200 mg per serving):      ¨ 6 ounces of yogurt (260 to 435)    ¨ 1 cup of nonfat, low-fat, or whole milk (350 to 380)    · Medium-potassium foods (50 to 200 mg per serving):      ¨ ½ cup of ricotta cheese (154)    ¨ ½ cup of vanilla ice cream (131)    ¨ ½ cup of low-fat (2%) cottage cheese (110)    · Low-potassium foods (less than 50 mg per serving):      ¨ 1 ounce of cheese (20 to 30)    Potassium content of grains:   · 1 slice of white bread (30)    · ½ cup of white or brown rice (50)    · ½ cup of spaghetti or macaroni (30)    · 1 flour or corn tortilla (50)    · 1 four-inch waffle (50)  Potassium content of other foods:   · 1 tablespoon of molasses (295)    · 1½ ounces of chocolate (165)    · Some salt substitutes may contain a high amount of potassium  Check the food label to find the amount of potassium it contains  © 2017 [employer] Information is for End User's use only and may not be sold, redistributed or otherwise used for commercial purposes  All illustrations and images included in CareNotes® are the copyrighted property of [employer] , [employer]  or [employer]  The above information is an  only  It is not intended as medical advice for individual conditions or treatments  Talk to your doctor, nurse or pharmacist before following any medical regimen to see if it is safe and effective for you

## 2020-12-02 DIAGNOSIS — I10 ESSENTIAL HYPERTENSION: ICD-10-CM

## 2020-12-02 DIAGNOSIS — I10 HYPERTENSION, UNSPECIFIED TYPE: ICD-10-CM

## 2020-12-02 RX ORDER — HYDROCHLOROTHIAZIDE 12.5 MG/1
TABLET ORAL
Qty: 90 TABLET | Refills: 3 | Status: SHIPPED | OUTPATIENT
Start: 2020-12-02 | End: 2021-11-29

## 2020-12-02 RX ORDER — METOPROLOL SUCCINATE 50 MG/1
TABLET, EXTENDED RELEASE ORAL
Qty: 90 TABLET | Refills: 3 | Status: SHIPPED | OUTPATIENT
Start: 2020-12-02 | End: 2021-11-29

## 2021-03-30 DIAGNOSIS — Z23 ENCOUNTER FOR IMMUNIZATION: ICD-10-CM

## 2021-03-31 ENCOUNTER — TELEPHONE (OUTPATIENT)
Dept: FAMILY MEDICINE CLINIC | Facility: CLINIC | Age: 56
End: 2021-03-31

## 2021-03-31 NOTE — TELEPHONE ENCOUNTER
----- Message from [address] sent at 3/29/2021 10:58 AM EDT -----  Called patient LM for her to call back to schedule appointment  ----- Message -----  From: Julio Cabezas MD  Sent: 3/26/2021  11:55 AM EDT  To: [address] [place] [address] y'all,    This patient missed an apt with me back in January and wasn't scheduled for follow-up  She needs to be scheduled for her annual physical exam if you guys can call and try to schedule her!     Thanks,    Julio Cabezas MD

## 2021-11-29 DIAGNOSIS — I10 ESSENTIAL HYPERTENSION: ICD-10-CM

## 2021-11-29 DIAGNOSIS — I10 HYPERTENSION, UNSPECIFIED TYPE: ICD-10-CM

## 2021-11-29 RX ORDER — HYDROCHLOROTHIAZIDE 12.5 MG/1
TABLET ORAL
Qty: 45 TABLET | Refills: 0 | Status: SHIPPED | OUTPATIENT
Start: 2021-11-29 | End: 2022-02-07 | Stop reason: SDUPTHER

## 2021-11-29 RX ORDER — METOPROLOL SUCCINATE 50 MG/1
TABLET, EXTENDED RELEASE ORAL
Qty: 45 TABLET | Refills: 0 | Status: SHIPPED | OUTPATIENT
Start: 2021-11-29 | End: 2022-02-07 | Stop reason: SDUPTHER

## 2022-02-07 ENCOUNTER — OFFICE VISIT (OUTPATIENT)
Dept: FAMILY MEDICINE CLINIC | Facility: CLINIC | Age: 57
End: 2022-02-07

## 2022-02-07 VITALS
SYSTOLIC BLOOD PRESSURE: 140 MMHG | WEIGHT: 260 LBS | OXYGEN SATURATION: 99 % | RESPIRATION RATE: 18 BRPM | HEIGHT: 64 IN | TEMPERATURE: 97.3 F | HEART RATE: 81 BPM | BODY MASS INDEX: 44.39 KG/M2 | DIASTOLIC BLOOD PRESSURE: 86 MMHG

## 2022-02-07 DIAGNOSIS — Z13.6 SCREENING FOR CARDIOVASCULAR CONDITION: ICD-10-CM

## 2022-02-07 DIAGNOSIS — Z23 ENCOUNTER FOR IMMUNIZATION: ICD-10-CM

## 2022-02-07 DIAGNOSIS — Z00.00 ANNUAL PHYSICAL EXAM: Primary | ICD-10-CM

## 2022-02-07 DIAGNOSIS — I10 ESSENTIAL HYPERTENSION: ICD-10-CM

## 2022-02-07 DIAGNOSIS — I10 HYPERTENSION, UNSPECIFIED TYPE: ICD-10-CM

## 2022-02-07 DIAGNOSIS — Z12.11 SCREENING FOR COLON CANCER: ICD-10-CM

## 2022-02-07 DIAGNOSIS — Z12.31 ENCOUNTER FOR SCREENING MAMMOGRAM FOR MALIGNANT NEOPLASM OF BREAST: ICD-10-CM

## 2022-02-07 PROCEDURE — 90472 IMMUNIZATION ADMIN EACH ADD: CPT | Performed by: FAMILY MEDICINE

## 2022-02-07 PROCEDURE — 99396 PREV VISIT EST AGE 40-64: CPT | Performed by: FAMILY MEDICINE

## 2022-02-07 PROCEDURE — 3725F SCREEN DEPRESSION PERFORMED: CPT | Performed by: FAMILY MEDICINE

## 2022-02-07 PROCEDURE — 90471 IMMUNIZATION ADMIN: CPT | Performed by: FAMILY MEDICINE

## 2022-02-07 PROCEDURE — 1036F TOBACCO NON-USER: CPT | Performed by: FAMILY MEDICINE

## 2022-02-07 PROCEDURE — 3008F BODY MASS INDEX DOCD: CPT | Performed by: FAMILY MEDICINE

## 2022-02-07 PROCEDURE — 90682 RIV4 VACC RECOMBINANT DNA IM: CPT | Performed by: FAMILY MEDICINE

## 2022-02-07 PROCEDURE — 90715 TDAP VACCINE 7 YRS/> IM: CPT | Performed by: FAMILY MEDICINE

## 2022-02-07 RX ORDER — HYDROCHLOROTHIAZIDE 12.5 MG/1
12.5 TABLET ORAL DAILY
Qty: 45 TABLET | Refills: 6 | Status: SHIPPED | OUTPATIENT
Start: 2022-02-07

## 2022-02-07 RX ORDER — METOPROLOL SUCCINATE 50 MG/1
50 TABLET, EXTENDED RELEASE ORAL DAILY
Qty: 45 TABLET | Refills: 6 | Status: SHIPPED | OUTPATIENT
Start: 2022-02-07

## 2022-02-07 NOTE — ASSESSMENT & PLAN NOTE
- H/o elevated fasting glucose, allergic rhinitis, HTN  - Screening for cardiovascular disease: yearly BMP and Lipid panel ordered today   - Screening for colorectal cancer: no fam history, cologuard preferred  - Screening for cervical cancer: Not up to date, declined  - Screening for breast cancer: mammogram ordered  - Vaccinations:  Tdap and flu given today  - Depression screenin  - Counseled the patient on healthy lifestyle habits

## 2022-02-07 NOTE — PROGRESS NOTES
13ADULT [address] [facility]    NAME: Parminder Rosales  AGE: 49 y o  SEX: female  : 1965     DATE: 2022     Assessment and Plan:     Problem List Items Addressed This Visit        Cardiovascular and Mediastinum    Hypertension    Relevant Medications    hydrochlorothiazide (HYDRODIURIL) 12 5 mg tablet    metoprolol succinate (TOPROL-XL) 50 mg 24 hr tablet       Other    Annual physical exam - Primary     - H/o elevated fasting glucose, allergic rhinitis, HTN  - Screening for cardiovascular disease: yearly BMP and Lipid panel ordered today   - Screening for colorectal cancer: no fam history, cologuard preferred  - Screening for cervical cancer: Not up to date, declined  - Screening for breast cancer: mammogram ordered  - Vaccinations: Tdap and flu given today  - Depression screenin  - Counseled the patient on healthy lifestyle habits             Other Visit Diagnoses     Encounter for immunization        Relevant Orders    Hepatitis C antibody    HIV 1/2 Antigen/Antibody (4th Generation) w Reflex [facility]    influenza vaccine, quadrivalent, recombinant, PF, 0 5 mL, for patients 18 yr+ (FLUBLOK) (Completed)    TDAP VACCINE GREATER THAN OR EQUAL TO 6YO IM (Completed)    Encounter for screening mammogram for malignant neoplasm of breast        Relevant Orders    Mammo screening bilateral w cad    Screening for colon cancer        Screening for cardiovascular condition        Relevant Orders    Lipid panel    Basic metabolic panel    Essential hypertension        Relevant Medications    hydrochlorothiazide (HYDRODIURIL) 12 5 mg tablet    metoprolol succinate (TOPROL-XL) 50 mg 24 hr tablet          Immunizations and preventive care screenings were discussed with patient today  Appropriate education was printed on patient's after visit summary  No follow-ups on file       Chief Complaint:     Chief Complaint   Patient presents with    Physical Exam      History of Present Illness:     Adult Annual Physical   Patient here for a comprehensive physical exam  The patient reports no problems  Diet and Physical Activity  · Diet/Nutrition: better, eating more salads and vegetables; monitoring carbohydrate intake      · Exercise: walking  Depression Screening  PHQ-2/9 Depression Screening    Little interest or pleasure in doing things: 0 - not at all  Feeling down, depressed, or hopeless: 0 - not at all  PHQ-2 Score: 0  PHQ-2 Interpretation: Negative depression screen       General Health  · Sleep: varies 5-8 hours nightly  · Hearing: normal - bilateral   · Vision: most recent eye exam >1 year ago and wears glasses  · Dental: regular dental visits, brushes teeth twice daily and flosses teeth occasionally  /GYN Health  · Patient is: postmenopausal  · Last menstrual period: > 1 year, maybe 3-4 years ago     Review of Systems:     Review of Systems   Constitutional: Negative for chills and fever  HENT: Positive for congestion and rhinorrhea  Respiratory: Negative  Cardiovascular: Negative  Gastrointestinal: Negative  Genitourinary: Negative for decreased urine volume, difficulty urinating, frequency, hematuria and menstrual problem  Musculoskeletal: Positive for arthralgias  Negative for back pain  Skin: Negative  Allergic/Immunologic: Positive for environmental allergies  Neurological: Positive for dizziness (hit her head on a freezer and had dizziness in that moment) and headaches (sinus headaches)  Psychiatric/Behavioral: Negative  Past Medical History:     History reviewed  No pertinent past medical history     Past Surgical History:     Past Surgical History:   Procedure Laterality Date    KNEE SURGERY        Social History:     Social History     Socioeconomic History    Marital status:      Spouse name: None    Number of children: None    Years of education: None    Highest education level: None   Occupational History    None   Tobacco Use    Smoking status: Never Smoker    Smokeless tobacco: Never Used   Vaping Use    Vaping Use: Never used   Substance and Sexual Activity    Alcohol use: Yes     Comment: Social    Drug use: Not Currently    Sexual activity: None   Other Topics Concern    None   Social History Narrative    Uses safety equipment: seatbelts     Social Determinants of Health     Financial Resource Strain: Not on file   Food Insecurity: Not on file   Transportation Needs: Not on file   Physical Activity: Not on file   Stress: Not on file   Social Connections: Not on file   Intimate Partner Violence: Not on file   Housing Stability: Not on file      Family History:     Family History   Problem Relation Age of Onset    Diabetes Mother         Mellitus    Hypertension Father       Current Medications:     Current Outpatient Medications   Medication Sig Dispense Refill    hydrochlorothiazide (HYDRODIURIL) 12 5 mg tablet Take 1 tablet (12 5 mg total) by mouth daily 45 tablet 6    metoprolol succinate (TOPROL-XL) 50 mg 24 hr tablet Take 1 tablet (50 mg total) by mouth daily 45 tablet 6     No current facility-administered medications for this visit  Allergies: Allergies   Allergen Reactions    Azithromycin     Penicillins       Physical Exam:     /86 (BP Location: Left arm, Patient Position: Sitting, Cuff Size: Large)   Pulse 81   Temp (!) 97 3 °F (36 3 °C) (Temporal)   Resp 18   Ht 5' 3 75" (1 619 m)   Wt 118 kg (260 lb)   SpO2 99%   BMI 44 98 kg/m²     Physical Exam  Vitals reviewed  Constitutional:       Appearance: Normal appearance  HENT:      Head: Normocephalic and atraumatic  Cardiovascular:      Rate and Rhythm: Normal rate and regular rhythm  Pulses: Normal pulses  Heart sounds: Normal heart sounds     Pulmonary:      Effort: Pulmonary effort is normal    Abdominal:      General: Bowel sounds are normal  Palpations: Abdomen is soft  Musculoskeletal:         General: Normal range of motion  Skin:     General: Skin is warm  Neurological:      Mental Status: She is alert  Mental status is at baseline     Psychiatric:         Mood and Affect: Mood normal          Behavior: Behavior normal           Tamiko Muñiz MD  [address]

## 2023-01-16 DIAGNOSIS — I10 ESSENTIAL HYPERTENSION: ICD-10-CM

## 2023-01-16 DIAGNOSIS — I10 HYPERTENSION, UNSPECIFIED TYPE: ICD-10-CM

## 2023-01-16 NOTE — TELEPHONE ENCOUNTER
Requested Prescriptions     Pending Prescriptions Disp Refills   • hydrochlorothiazide (HYDRODIURIL) 12 5 mg tablet 45 tablet 6     Sig: Take 1 tablet (12 5 mg total) by mouth daily   • metoprolol succinate (TOPROL-XL) 50 mg 24 hr tablet 45 tablet 6     Sig: Take 1 tablet (50 mg total) by mouth daily         -Number of refills left: 0    -Amount of medication left:    -Pharmacy verified and updated:Yes    -Additional information: Patient left message on rx line requesting refill on this medications  Patient last visit was on February 2022 with Dr Lizett Locke     -Clerical please contact patient to establish with new pcp  Thank You  One month supply sent to her pharmacy

## 2023-01-17 RX ORDER — HYDROCHLOROTHIAZIDE 12.5 MG/1
12.5 TABLET ORAL DAILY
Qty: 30 TABLET | Refills: 0 | Status: SHIPPED | OUTPATIENT
Start: 2023-01-17 | End: 2023-01-19

## 2023-01-17 RX ORDER — METOPROLOL SUCCINATE 50 MG/1
50 TABLET, EXTENDED RELEASE ORAL DAILY
Qty: 30 TABLET | Refills: 0 | Status: SHIPPED | OUTPATIENT
Start: 2023-01-17 | End: 2023-01-19

## 2023-01-18 NOTE — TELEPHONE ENCOUNTER
Spoke with patient, scheduled with Dr Alyssia Grewal 2/8/23   She is leaving for vacation on Tuesday, would like these meds resent to [facility] on [address] (on file) , for some reason they were sent to [facility] on [address]

## 2023-01-19 RX ORDER — HYDROCHLOROTHIAZIDE 12.5 MG/1
12.5 TABLET ORAL DAILY
Qty: 30 TABLET | Refills: 0 | Status: SHIPPED | OUTPATIENT
Start: 2023-01-19

## 2023-01-19 RX ORDER — METOPROLOL SUCCINATE 50 MG/1
50 TABLET, EXTENDED RELEASE ORAL DAILY
Qty: 30 TABLET | Refills: 0 | Status: SHIPPED | OUTPATIENT
Start: 2023-01-19

## 2023-01-19 NOTE — TELEPHONE ENCOUNTER
Good Morning Dr Paty Dumont  Please review and resend medication to [facility] [address]   Thanks

## 2023-02-08 ENCOUNTER — OFFICE VISIT (OUTPATIENT)
Dept: FAMILY MEDICINE CLINIC | Facility: CLINIC | Age: 58
End: 2023-02-08

## 2023-02-08 VITALS
OXYGEN SATURATION: 100 % | WEIGHT: 252 LBS | DIASTOLIC BLOOD PRESSURE: 90 MMHG | TEMPERATURE: 97.6 F | HEART RATE: 86 BPM | RESPIRATION RATE: 18 BRPM | SYSTOLIC BLOOD PRESSURE: 135 MMHG | HEIGHT: 64 IN | BODY MASS INDEX: 43.02 KG/M2

## 2023-02-08 DIAGNOSIS — Z00.00 HEALTHCARE MAINTENANCE: ICD-10-CM

## 2023-02-08 DIAGNOSIS — Z12.31 SCREENING MAMMOGRAM FOR BREAST CANCER: ICD-10-CM

## 2023-02-08 DIAGNOSIS — Z13.6 ENCOUNTER FOR SCREENING FOR CARDIOVASCULAR DISORDERS: ICD-10-CM

## 2023-02-08 DIAGNOSIS — Z11.4 SCREENING FOR HIV (HUMAN IMMUNODEFICIENCY VIRUS): ICD-10-CM

## 2023-02-08 DIAGNOSIS — E66.01 MORBID OBESITY WITH BMI OF 40.0-44.9, ADULT (HCC): ICD-10-CM

## 2023-02-08 DIAGNOSIS — Z11.59 ENCOUNTER FOR HEPATITIS C SCREENING TEST FOR LOW RISK PATIENT: ICD-10-CM

## 2023-02-08 DIAGNOSIS — I10 ESSENTIAL HYPERTENSION: Primary | ICD-10-CM

## 2023-02-08 RX ORDER — HYDROCHLOROTHIAZIDE 12.5 MG/1
12.5 TABLET ORAL DAILY
Qty: 90 TABLET | Refills: 2 | Status: SHIPPED | OUTPATIENT
Start: 2023-02-08

## 2023-02-08 RX ORDER — METOPROLOL SUCCINATE 50 MG/1
50 TABLET, EXTENDED RELEASE ORAL DAILY
Qty: 90 TABLET | Refills: 2 | Status: SHIPPED | OUTPATIENT
Start: 2023-02-08

## 2023-02-08 NOTE — PROGRESS NOTES
Name: Kaykay Lackey      : 1965      MRN: [id number]  Encounter Provider: Danielle Fregoso MD  Encounter Date: 2023   Encounter department: [address] [place] [address]  Essential hypertension  Assessment & Plan:  Patient has a history of well-controlled essential hypertension on 2 medications  Blood pressure within goal on encounter today, 135/90  Denies any symptoms including headaches, visual disturbances, chest pain, shortness of breath  - Provided refills for both metoprolol and hydrochlorothiazide today    Orders:  -     hydrochlorothiazide (HYDRODIURIL) 12 5 mg tablet; Take 1 tablet (12 5 mg total) by mouth daily  -     metoprolol succinate (TOPROL-XL) 50 mg 24 hr tablet; Take 1 tablet (50 mg total) by mouth daily    2  Encounter for screening for cardiovascular disorders  Assessment & Plan:  Discussed getting lab work done for cardiovascular screening  Patient was agreeable and also agreed to return for a formal annual physical in the next 6 months  - CMP, hemoglobin A1c, lipid profile ordered today    Orders:  -     Hemoglobin A1C; Future  -     Lipid Panel with Direct LDL reflex; Future  -     Comprehensive metabolic panel; Future    3  Screening for HIV (human immunodeficiency virus)  Assessment & Plan:  Patient has not had HIV screening done, agreeable today  - HIV screen ordered    Orders:  -     HIV 1/2 AG/AB W REFLEX [facility] and [facility] Only; Future    4  Encounter for hepatitis C screening test for low risk patient  Assessment & Plan:  Patient has not had hep C screening completed, agreeable today  - Hep C screen ordered    Orders:  -     Hepatitis C antibody; Future    5  Morbid obesity with BMI of 40 0-44 9, adult [facility])  Assessment & Plan:  Discussed patient's weight and eating habits  Patient agrees losing weight would be in her best interest and overall be a benefit to her health    She understands increasing fruits and vegetables into her diet as well as regular exercise would be of greatest benefit  -Exercise counseling and nutrition counseling provided during visit today      6  Screening mammogram for breast cancer  Assessment & Plan:  Patient due for yearly mammogram   - Mammogram order placed  Orders:  -     Mammo screening bilateral w 3d & cad; Future; Expected date: 02/08/2023    7  Healthcare maintenance  Assessment & Plan:  Patient will return in the spring for a formal annual physical and to start scheduling routine screenings  BMI Counseling: Body mass index is 43 26 kg/m²  The BMI is above normal  Nutrition recommendations include decreasing portion sizes, encouraging healthy choices of fruits and vegetables and limiting drinks that contain sugar  Exercise recommendations include exercising 3-5 times per week  Rationale for BMI follow-up plan is due to patient being overweight or obese  Depression Screening and Follow-up Plan: Patient was screened for depression during today's encounter  They screened negative with a PHQ-2 score of 0  Subjective      Patient is a 60-year-old female presenting today for follow-up of her hypertension and medication refills  She has been on hydrochlorothiazide and metoprolol for some time  She has not been seen in the clinic and has not had her blood pressure taken recently  Upon arrival to the clinic today she overall feels well and her blood pressure is within goal range: 135/90  She has no acute complaints or new symptoms today  We discussed more follow-up in the future and the patient agreed to return for a formal annual physical   We discussed her current care gaps and missing screenings  She was happy to get blood work done prior to that visit and address all care gaps  Review of Systems   Constitutional: Negative for fatigue, fever and unexpected weight change  HENT: Positive for congestion (recent URI) and rhinorrhea  Eyes: Negative    Negative for visual disturbance  Respiratory: Positive for cough (recent URI)  Cardiovascular: Negative for chest pain and palpitations  Gastrointestinal: Negative  Genitourinary: Negative  Musculoskeletal: Negative  Skin: Negative  Neurological: Negative  Psychiatric/Behavioral: Negative  Current Outpatient Medications on File Prior to Visit   Medication Sig   • [DISCONTINUED] hydrochlorothiazide (HYDRODIURIL) 12 5 mg tablet Take 1 tablet (12 5 mg total) by mouth daily   • [DISCONTINUED] metoprolol succinate (TOPROL-XL) 50 mg 24 hr tablet Take 1 tablet (50 mg total) by mouth daily       Objective     /90   Pulse 86   Temp 97 6 °F (36 4 °C)   Resp 18   Ht 5' 4" (1 626 m)   Wt 114 kg (252 lb)   SpO2 100%   BMI 43 26 kg/m²     Physical Exam  Constitutional:       General: She is not in acute distress  Appearance: Normal appearance  She is obese  She is not ill-appearing  HENT:      Head: Normocephalic  Right Ear: Tympanic membrane, ear canal and external ear normal  There is no impacted cerumen  Left Ear: Tympanic membrane, ear canal and external ear normal  There is no impacted cerumen  Nose: Congestion and rhinorrhea present  Mouth/Throat:      Mouth: Mucous membranes are moist       Pharynx: Oropharynx is clear  No posterior oropharyngeal erythema  Eyes:      General: No scleral icterus  Right eye: No discharge  Left eye: No discharge  Extraocular Movements: Extraocular movements intact  Cardiovascular:      Rate and Rhythm: Normal rate and regular rhythm  Pulses: Normal pulses  Heart sounds: Normal heart sounds  Pulmonary:      Effort: Pulmonary effort is normal  No respiratory distress  Breath sounds: Normal breath sounds  Abdominal:      General: Bowel sounds are normal  There is no distension  Palpations: Abdomen is soft  Tenderness: There is no abdominal tenderness     Musculoskeletal:      Cervical back: Normal range of motion  Right lower leg: No edema  Left lower leg: No edema  Lymphadenopathy:      Cervical: No cervical adenopathy  Skin:     General: Skin is warm and dry  Capillary Refill: Capillary refill takes less than 2 seconds  Findings: No rash  Neurological:      General: No focal deficit present  Mental Status: She is alert and oriented to person, place, and time  Sensory: No sensory deficit  Motor: No weakness     Psychiatric:         Mood and Affect: Mood normal          Behavior: Behavior normal        Claudette Smith MD

## 2023-02-08 NOTE — ASSESSMENT & PLAN NOTE
Patient will return in the spring for a formal annual physical and to start scheduling routine screenings

## 2023-02-08 NOTE — ASSESSMENT & PLAN NOTE
Patient has a history of well-controlled essential hypertension on 2 medications  Blood pressure within goal on encounter today, 135/90  Denies any symptoms including headaches, visual disturbances, chest pain, shortness of breath    - Provided refills for both metoprolol and hydrochlorothiazide today

## 2023-02-08 NOTE — ASSESSMENT & PLAN NOTE
Discussed getting lab work done for cardiovascular screening  Patient was agreeable and also agreed to return for a formal annual physical in the next 6 months    - CMP, hemoglobin A1c, lipid profile ordered today

## 2023-02-08 NOTE — ASSESSMENT & PLAN NOTE
Discussed patient's weight and eating habits  Patient agrees losing weight would be in her best interest and overall be a benefit to her health  She understands increasing fruits and vegetables into her diet as well as regular exercise would be of greatest benefit    -Exercise counseling and nutrition counseling provided during visit today

## 2023-04-09 PROBLEM — Z00.00 HEALTHCARE MAINTENANCE: Status: RESOLVED | Noted: 2023-02-08 | Resolved: 2023-04-09

## 2023-04-09 PROBLEM — Z13.6 ENCOUNTER FOR SCREENING FOR CARDIOVASCULAR DISORDERS: Status: RESOLVED | Noted: 2023-02-08 | Resolved: 2023-04-09

## 2023-07-19 ENCOUNTER — RA CDI HCC (OUTPATIENT)
Dept: OTHER | Facility: HOSPITAL | Age: 58
End: 2023-07-19

## 2023-07-19 NOTE — PROGRESS NOTES
[address] [facility] coding opportunities       Chart reviewed, no opportunity found: CHART REVIEWED, NO OPPORTUNITY FOUND        Patients Insurance        Commercial Insurance: [employer]

## 2023-07-24 ENCOUNTER — OFFICE VISIT (OUTPATIENT)
Dept: FAMILY MEDICINE CLINIC | Facility: CLINIC | Age: 58
End: 2023-07-24

## 2023-07-24 VITALS
HEIGHT: 64 IN | DIASTOLIC BLOOD PRESSURE: 81 MMHG | BODY MASS INDEX: 42.34 KG/M2 | TEMPERATURE: 97.8 F | WEIGHT: 248 LBS | SYSTOLIC BLOOD PRESSURE: 138 MMHG | HEART RATE: 68 BPM

## 2023-07-24 DIAGNOSIS — I10 ESSENTIAL HYPERTENSION: ICD-10-CM

## 2023-07-24 DIAGNOSIS — Z00.00 ANNUAL PHYSICAL EXAM: Primary | ICD-10-CM

## 2023-07-24 DIAGNOSIS — J30.89 NON-SEASONAL ALLERGIC RHINITIS DUE TO OTHER ALLERGIC TRIGGER: ICD-10-CM

## 2023-07-24 DIAGNOSIS — Z11.59 ENCOUNTER FOR HEPATITIS C SCREENING TEST FOR LOW RISK PATIENT: ICD-10-CM

## 2023-07-24 DIAGNOSIS — Z12.11 SCREENING FOR COLORECTAL CANCER: ICD-10-CM

## 2023-07-24 DIAGNOSIS — Z12.12 SCREENING FOR COLORECTAL CANCER: ICD-10-CM

## 2023-07-24 DIAGNOSIS — E66.01 MORBID OBESITY WITH BMI OF 40.0-44.9, ADULT (HCC): ICD-10-CM

## 2023-07-24 DIAGNOSIS — R25.2 LEG CRAMPING: ICD-10-CM

## 2023-07-24 DIAGNOSIS — Z11.4 SCREENING FOR HIV (HUMAN IMMUNODEFICIENCY VIRUS): ICD-10-CM

## 2023-07-24 DIAGNOSIS — Z12.31 SCREENING MAMMOGRAM FOR BREAST CANCER: ICD-10-CM

## 2023-07-24 DIAGNOSIS — Z12.4 SCREENING FOR CERVICAL CANCER: ICD-10-CM

## 2023-07-24 PROCEDURE — 99396 PREV VISIT EST AGE 40-64: CPT | Performed by: FAMILY MEDICINE

## 2023-07-24 NOTE — ASSESSMENT & PLAN NOTE
Patient agreeable to go for cervical cancer screening.  -Referral to OB/GYN provided  -Informed patient that we can perform cervical screening here in our clinic if she preferred

## 2023-07-24 NOTE — ASSESSMENT & PLAN NOTE
Patient complaining of intermittent runny nose, itchy eyes.   Likely allergies both indoor and outdoor given her description.  -Recommended OTC Zyrtec trial.

## 2023-07-24 NOTE — PROGRESS NOTES
[address] [facility] NOTODGABRIELLA    NAME: Cathryn Rosales  AGE: 66 y.o. SEX: female  : 1965     DATE: 2023     Assessment and Plan:     Problem List Items Addressed This Visit        Respiratory    Allergic rhinitis     Patient complaining of intermittent runny nose, itchy eyes. Likely allergies both indoor and outdoor given her description.  -Recommended OTC Zyrtec trial.            Cardiovascular and Mediastinum    Essential hypertension     Patient blood pressure at goal today.  -No changes to antihypertensives, no refills requested today            Other    Morbid obesity with BMI of 40.0-44.9, adult ([address])     Patient's weight down slightly today and down about 12 pounds since last year. Advised continued healthy eating habits and regular physical activity. Leg cramping     Advised patient to stay adequately hydrated with electrolyte and carbohydrate containing fluids. Discussed stretching as a technique to alleviate cramps. Annual physical exam - Primary     Cardiovascular disease screening labs still pending  -Lipid panel, A1c, CMP         Screening for HIV (human immunodeficiency virus)     HIV screen pending         Encounter for hepatitis C screening test for low risk patient     Hep C screen pending         Screening mammogram for breast cancer     Screening mammogram ordered during last visit. Patient still has not completed, will complete by next visit. Screening for cervical cancer     Patient agreeable to go for cervical cancer screening.  -Referral to OB/GYN provided  -Informed patient that we can perform cervical screening here in our clinic if she preferred         Relevant Orders    Ambulatory referral to Obstetrics / Gynecology    Screening for colorectal cancer     Patient has not yet had colorectal cancer screening.   -GI referral provided today         Relevant Orders Ambulatory referral to Gastroenterology       Immunizations and preventive care screenings were discussed with patient today. Appropriate education was printed on patient's after visit summary. Counseling:  Alcohol/drug use: discussed moderation in alcohol intake, the recommendations for healthy alcohol use, and avoidance of illicit drug use. Dental Health: discussed importance of regular tooth brushing, flossing, and dental visits. Injury prevention: discussed safety/seat belts, safety helmets, smoke detectors, carbon dioxide detectors, and smoking near bedding or upholstery. · Exercise: the importance of regular exercise/physical activity was discussed. Recommend exercise 3-5 times per week for at least 30 minutes. Return in about 1 year (around 7/24/2024) for Annual physical.     Chief Complaint:     Chief Complaint   Patient presents with   • Physical Exam     Allergies        History of Present Illness:     Adult Annual Physical   Patient here for a comprehensive physical exam. The patient reports problems - seasonal allergies, LE sorness especially after work. Diet and Physical Activity  · Diet/Nutrition: poor diet, limited fruits/vegetables and Fruit intake has been increasing. · Exercise: Works on feet for 6-8 hours per day. Depression Screening  PHQ-2/9 Depression Screening         General Health  · Sleep: gets 7-8 hours of sleep on average and Sleeps ~8hrs but not consistently throughout the night. · Hearing: normal - bilateral.  · Vision: Needs new glasses. · Dental: regular dental visits. /GYN Health  · Patient is: postmenopausal  · Last menstrual period: 6 years ago  · Contraceptive method: not sexually active. Review of Systems:     Review of Systems   Constitutional: Negative. HENT: Positive for congestion, rhinorrhea and sneezing. Eyes: Positive for itching. Respiratory: Positive for wheezing. Cardiovascular: Negative. Gastrointestinal: Negative. Genitourinary: Negative. Musculoskeletal: Positive for myalgias (LEs). Skin: Negative. Neurological: Negative. Hematological: Negative. Psychiatric/Behavioral: Negative. All other systems reviewed and are negative. Past Medical History:     History reviewed. No pertinent past medical history. Past Surgical History:     Past Surgical History:   Procedure Laterality Date   • KNEE SURGERY        Social History:     Social History     Socioeconomic History   • Marital status:      Spouse name: None   • Number of children: None   • Years of education: None   • Highest education level: None   Occupational History   • None   Tobacco Use   • Smoking status: Never   • Smokeless tobacco: Never   Vaping Use   • Vaping Use: Never used   Substance and Sexual Activity   • Alcohol use: Yes     Comment: Social   • Drug use: Not Currently   • Sexual activity: None   Other Topics Concern   • None   Social History Narrative    Uses safety equipment: seatbelts     Social Determinants of Health     Financial Resource Strain: Low Risk  (2/8/2023)    Overall Financial Resource Strain (CARDIA)    • Difficulty of Paying Living Expenses: Not hard at all   Food Insecurity: No Food Insecurity (2/8/2023)    Hunger Vital Sign    • Worried About Running Out of Food in the Last Year: Never true    • Ran Out of Food in the Last Year: Never true   Transportation Needs: No Transportation Needs (2/8/2023)    PRAPARE - Transportation    • Lack of Transportation (Medical): No    • Lack of Transportation (Non-Medical):  No   Physical Activity: Sufficiently Active (2/8/2023)    Exercise Vital Sign    • Days of Exercise per Week: 5 days    • Minutes of Exercise per Session: 60 min   Stress: No Stress Concern Present (2/8/2023)    [address] [place] [address]    • Feeling of Stress : Not at all   Social Connections: Unknown (6/10/2019)    Social Connection and Isolation Panel [NHANES]    • Frequency of Communication with Friends and Family: Not on file    • Frequency of Social Gatherings with Friends and Family: Not on file    • Attends Sabianist Services: More than 4 times per year    • Active Member of Clubs or Organizations: Yes    • Attends Club or Organization Meetings: More than 4 times per year    • Marital Status:    Intimate Partner Violence: Not At Risk (2/8/2023)    Humiliation, Afraid, Rape, and Kick questionnaire    • Fear of Current or Ex-Partner: No    • Emotionally Abused: No    • Physically Abused: No    • Sexually Abused: No   Housing Stability: Low Risk  (2/8/2023)    Housing Stability Vital Sign    • Unable to Pay for Housing in the Last Year: No    • Number of Places Lived in the Last Year: 1    • Unstable Housing in the Last Year: No      Family History:     Family History   Problem Relation Age of Onset   • Diabetes Mother         Mellitus   • Hypertension Father       Current Medications:     Current Outpatient Medications   Medication Sig Dispense Refill   • hydrochlorothiazide (HYDRODIURIL) 12.5 mg tablet Take 1 tablet (12.5 mg total) by mouth daily 90 tablet 2   • metoprolol succinate (TOPROL-XL) 50 mg 24 hr tablet Take 1 tablet (50 mg total) by mouth daily 90 tablet 2     No current facility-administered medications for this visit. Allergies: Allergies   Allergen Reactions   • Azithromycin    • Penicillins       Physical Exam:     /81 (BP Location: Left arm, Patient Position: Sitting, Cuff Size: Large)   Pulse 68   Temp 97.8 °F (36.6 °C) (Temporal)   Ht 5' 4" (1.626 m)   Wt 112 kg (248 lb)   BMI 42.57 kg/m²     Physical Exam  Constitutional:       Appearance: Normal appearance. HENT:      Head: Normocephalic.       Right Ear: Tympanic membrane and ear canal normal.      Left Ear: Tympanic membrane and ear canal normal.      Nose: Nose normal.      Mouth/Throat:      Mouth: Mucous membranes are moist.      Pharynx: Oropharynx is clear. No oropharyngeal exudate or posterior oropharyngeal erythema. Eyes:      Extraocular Movements: Extraocular movements intact. Pupils: Pupils are equal, round, and reactive to light. Cardiovascular:      Rate and Rhythm: Regular rhythm. Pulses: Normal pulses. Heart sounds: Normal heart sounds. Pulmonary:      Effort: Pulmonary effort is normal. No respiratory distress. Breath sounds: Normal breath sounds. No wheezing. Abdominal:      General: Abdomen is flat. There is no distension. Palpations: Abdomen is soft. Musculoskeletal:         General: No tenderness. Normal range of motion. Cervical back: Normal range of motion and neck supple. Skin:     General: Skin is warm and dry. Neurological:      Mental Status: alert and oriented to person, place, and time. Mental status is at baseline.    Psychiatric:         Mood and Affect: Mood normal.         Behavior: Behavior normal.       Lianet Muhammad MD  [facility]

## 2023-07-24 NOTE — ASSESSMENT & PLAN NOTE
Advised patient to stay adequately hydrated with electrolyte and carbohydrate containing fluids. Discussed stretching as a technique to alleviate cramps.

## 2023-07-24 NOTE — ASSESSMENT & PLAN NOTE
Screening mammogram ordered during last visit. Patient still has not completed, will complete by next visit.

## 2023-07-24 NOTE — ASSESSMENT & PLAN NOTE
Patient's weight down slightly today and down about 12 pounds since last year. Advised continued healthy eating habits and regular physical activity.

## 2023-09-22 PROBLEM — Z12.12 SCREENING FOR COLORECTAL CANCER: Status: RESOLVED | Noted: 2023-07-24 | Resolved: 2023-09-22

## 2023-09-22 PROBLEM — Z12.4 SCREENING FOR CERVICAL CANCER: Status: RESOLVED | Noted: 2023-07-24 | Resolved: 2023-09-22

## 2023-09-22 PROBLEM — Z12.11 SCREENING FOR COLORECTAL CANCER: Status: RESOLVED | Noted: 2023-07-24 | Resolved: 2023-09-22

## 2023-11-28 DIAGNOSIS — I10 ESSENTIAL HYPERTENSION: ICD-10-CM

## 2023-11-28 RX ORDER — HYDROCHLOROTHIAZIDE 12.5 MG/1
12.5 TABLET ORAL DAILY
Qty: 90 TABLET | Refills: 2 | Status: SHIPPED | OUTPATIENT
Start: 2023-11-28

## 2023-11-28 RX ORDER — METOPROLOL SUCCINATE 50 MG/1
50 TABLET, EXTENDED RELEASE ORAL DAILY
Qty: 90 TABLET | Refills: 2 | Status: SHIPPED | OUTPATIENT
Start: 2023-11-28

## 2024-08-18 DIAGNOSIS — I10 ESSENTIAL HYPERTENSION: ICD-10-CM

## 2024-08-19 RX ORDER — HYDROCHLOROTHIAZIDE 12.5 MG/1
12.5 TABLET ORAL DAILY
Qty: 90 TABLET | Refills: 0 | Status: SHIPPED | OUTPATIENT
Start: 2024-08-19

## 2024-08-19 RX ORDER — METOPROLOL SUCCINATE 50 MG/1
50 TABLET, EXTENDED RELEASE ORAL DAILY
Qty: 90 TABLET | Refills: 0 | Status: SHIPPED | OUTPATIENT
Start: 2024-08-19

## 2024-08-19 NOTE — TELEPHONE ENCOUNTER
Changed from 270d refills to 90d. Patient should be seen before next set of refills. Last appt ~8 months ago

## 2024-11-16 DIAGNOSIS — I10 ESSENTIAL HYPERTENSION: ICD-10-CM

## 2024-11-18 RX ORDER — METOPROLOL SUCCINATE 50 MG/1
50 TABLET, EXTENDED RELEASE ORAL DAILY
Qty: 60 TABLET | Refills: 0 | Status: SHIPPED | OUTPATIENT
Start: 2024-11-18

## 2024-11-18 RX ORDER — HYDROCHLOROTHIAZIDE 12.5 MG/1
12.5 TABLET ORAL DAILY
Qty: 60 TABLET | Refills: 0 | Status: SHIPPED | OUTPATIENT
Start: 2024-11-18

## 2024-12-11 DIAGNOSIS — I10 ESSENTIAL HYPERTENSION: ICD-10-CM

## 2024-12-11 RX ORDER — HYDROCHLOROTHIAZIDE 12.5 MG/1
12.5 TABLET ORAL DAILY
Qty: 90 TABLET | Refills: 1 | OUTPATIENT
Start: 2024-12-11

## 2024-12-18 DIAGNOSIS — I10 ESSENTIAL HYPERTENSION: ICD-10-CM

## 2024-12-19 DIAGNOSIS — I10 ESSENTIAL HYPERTENSION: ICD-10-CM

## 2024-12-19 RX ORDER — METOPROLOL SUCCINATE 50 MG/1
50 TABLET, EXTENDED RELEASE ORAL DAILY
Qty: 30 TABLET | Refills: 0 | Status: SHIPPED | OUTPATIENT
Start: 2024-12-19 | End: 2024-12-19

## 2024-12-19 RX ORDER — METOPROLOL SUCCINATE 50 MG/1
50 TABLET, EXTENDED RELEASE ORAL DAILY
Qty: 90 TABLET | Refills: 0 | Status: SHIPPED | OUTPATIENT
Start: 2024-12-19

## 2024-12-24 NOTE — TELEPHONE ENCOUNTER
LVM for patient to schedule appointment  Medication refused patient has not been seen since 2023        Patient can be reached at [phone]

## 2025-03-25 DIAGNOSIS — I10 ESSENTIAL HYPERTENSION: ICD-10-CM

## 2025-03-27 RX ORDER — HYDROCHLOROTHIAZIDE 12.5 MG/1
12.5 TABLET ORAL DAILY
Qty: 60 TABLET | Refills: 0 | OUTPATIENT
Start: 2025-03-27

## 2025-03-28 ENCOUNTER — RA CDI HCC (OUTPATIENT)
Dept: OTHER | Facility: HOSPITAL | Age: 60
End: 2025-03-28

## 2025-03-28 NOTE — PROGRESS NOTES
HCC coding opportunities       Chart reviewed, no opportunity found: CHART REVIEWED, NO OPPORTUNITY FOUND        Patients Insurance        Commercial Insurance: [employer] Commercial [employer]       
yes

## 2025-04-03 ENCOUNTER — OFFICE VISIT (OUTPATIENT)
Dept: FAMILY MEDICINE CLINIC | Facility: CLINIC | Age: 60
End: 2025-04-03

## 2025-04-03 VITALS
HEIGHT: 64 IN | BODY MASS INDEX: 42.82 KG/M2 | HEART RATE: 80 BPM | DIASTOLIC BLOOD PRESSURE: 99 MMHG | TEMPERATURE: 97.9 F | RESPIRATION RATE: 16 BRPM | SYSTOLIC BLOOD PRESSURE: 154 MMHG | OXYGEN SATURATION: 100 % | WEIGHT: 250.8 LBS

## 2025-04-03 DIAGNOSIS — Z12.11 ENCOUNTER FOR SCREENING COLONOSCOPY: ICD-10-CM

## 2025-04-03 DIAGNOSIS — I10 ESSENTIAL HYPERTENSION: Primary | ICD-10-CM

## 2025-04-03 DIAGNOSIS — Z13.6 SCREENING FOR CARDIOVASCULAR CONDITION: ICD-10-CM

## 2025-04-03 DIAGNOSIS — Z12.31 SCREENING MAMMOGRAM FOR BREAST CANCER: ICD-10-CM

## 2025-04-03 DIAGNOSIS — Z13.1 SCREENING FOR DIABETES MELLITUS: ICD-10-CM

## 2025-04-03 PROCEDURE — 99213 OFFICE O/P EST LOW 20 MIN: CPT

## 2025-04-03 RX ORDER — METOPROLOL SUCCINATE 50 MG/1
50 TABLET, EXTENDED RELEASE ORAL DAILY
Qty: 90 TABLET | Refills: 1 | Status: SHIPPED | OUTPATIENT
Start: 2025-04-03

## 2025-04-03 RX ORDER — HYDROCHLOROTHIAZIDE 12.5 MG/1
12.5 TABLET ORAL DAILY
Qty: 90 TABLET | Refills: 1 | Status: SHIPPED | OUTPATIENT
Start: 2025-04-03

## 2025-04-03 NOTE — PATIENT INSTRUCTIONS
"Patient Education     Routine physical for adults   The Basics   Written by the doctors and editors at [facility]   What is a physical? -- A physical is a routine visit, or \"check-up,\" with your doctor. You might also hear it called a \"wellness visit\" or \"preventive visit.\"  During each visit, the doctor will:   Ask about your physical and mental health   Ask about your habits, behaviors, and lifestyle   Do an exam   Give you vaccines if needed   Talk to you about any medicines you take   Give advice about your health   Answer your questions  Getting regular check-ups is an important part of taking care of your health. It can help your doctor find and treat any problems you have. But it's also important for preventing health problems.  A routine physical is different from a \"sick visit.\" A sick visit is when you see a doctor because of a health concern or problem. Since physicals are scheduled ahead of time, you can think about what you want to ask the doctor.  How often should I get a physical? -- It depends on your age and health. In general, for people age 21 years and older:   If you are younger than 50 years, you might be able to get a physical every 3 years.   If you are 50 years or older, your doctor might recommend a physical every year.  If you have an ongoing health condition, like diabetes or high blood pressure, your doctor will probably want to see you more often.  What happens during a physical? -- In general, each visit will include:   Physical exam - The doctor or nurse will check your height, weight, heart rate, and blood pressure. They will also look at your eyes and ears. They will ask about how you are feeling and whether you have any symptoms that bother you.   Medicines - It's a good idea to bring a list of all the medicines you take to each doctor visit. Your doctor will talk to you about your medicines and answer any questions. Tell them if you are having any side effects that bother you. You " "should also tell them if you are having trouble paying for any of your medicines.   Habits and behaviors - This includes:   Your diet   Your exercise habits   Whether you smoke, drink alcohol, or use drugs   Whether you are sexually active   Whether you feel safe at home  Your doctor will talk to you about things you can do to improve your health and lower your risk of health problems. They will also offer help and support. For example, if you want to quit smoking, they can give you advice and might prescribe medicines. If you want to improve your diet or get more physical activity, they can help you with this, too.   Lab tests, if needed - The tests you get will depend on your age and situation. For example, your doctor might want to check your:   Cholesterol   Blood sugar   Iron level   Vaccines - The recommended vaccines will depend on your age, health, and what vaccines you already had. Vaccines are very important because they can prevent certain serious or deadly infections.   Discussion of screening - \"Screening\" means checking for diseases or other health problems before they cause symptoms. Your doctor can recommend screening based on your age, risk, and preferences. This might include tests to check for:   Cancer, such as breast, prostate, cervical, ovarian, colorectal, prostate, lung, or skin cancer   Sexually transmitted infections, such as chlamydia and gonorrhea   Mental health conditions like depression and anxiety  Your doctor will talk to you about the different types of screening tests. They can help you decide which screenings to have. They can also explain what the results might mean.   Answering questions - The physical is a good time to ask the doctor or nurse questions about your health. If needed, they can refer you to other doctors or specialists, too.  Adults older than 65 years often need other care, too. As you get older, your doctor will talk to you about:   How to prevent falling at " home   Hearing or vision tests   Memory testing   How to take your medicines safely   Making sure that you have the help and support you need at home  All topics are updated as new evidence becomes available and our peer review process is complete.  This topic retrieved from "[employer] on: May 02, 2024.  Topic 415282 Version 1.0  Release: 32.4.3 - C32.122  © 2024 Up[employer]Date[employer] Inc[employer] and/or its affiliates. All rights reserved.  Consumer Information Use and Disclaimer   Disclaimer: This generalized information is a limited summary of diagnosis, treatment, and/or medication information. It is not meant to be comprehensive and should be used as a tool to help the user understand and/or assess potential diagnostic and treatment options. It does NOT include all information about conditions, treatments, medications, side effects, or risks that may apply to a specific patient. It is not intended to be medical advice or a substitute for the medical advice, diagnosis, or treatment of a health care provider based on the health care provider's examination and assessment of a patient's specific and unique circumstances. Patients must speak with a health care provider for complete information about their health, medical questions, and treatment options, including any risks or benefits regarding use of medications. This information does not endorse any treatments or medications as safe, effective, or approved for treating a specific patient. Up[employer]Date[employer] Inc[employer] and its affiliates disclaim any warranty or liability relating to this information or the use thereof.The use of this information is governed by the Terms of Use, available at https://www.woltersVideoplazauwer.com/en/know/clinical-effectiveness-terms. 2024© UpToDate, Inc. and its affiliates and/or licensors. All rights reserved.  Copyright   © 2024 UpToDate, Inc. and/or its affiliates. All rights reserved.

## 2025-04-04 PROBLEM — Z13.1 SCREENING FOR DIABETES MELLITUS: Status: ACTIVE | Noted: 2025-04-04

## 2025-04-04 PROBLEM — Z13.6 SCREENING FOR CARDIOVASCULAR CONDITION: Status: ACTIVE | Noted: 2025-04-04

## 2025-04-04 PROBLEM — Z12.11 ENCOUNTER FOR SCREENING COLONOSCOPY: Status: ACTIVE | Noted: 2025-04-04

## 2025-04-04 NOTE — ASSESSMENT & PLAN NOTE
BP elevated in office today. She has not been taking her medication consistently over the past few days. Has been well controlled on same regimen in the past. No symptoms today  - Rec'd home BP monitoring. Cuff prescribed, but she is wiling to purchase her own if not covered.   - Gave parameters for normal ranges and instructed her to take an additional 12.5mg hctz if her BP is consistently above 140/90.  - Instructed to call and inform the office if she makes this change   - Follow up on BP at annual wellness visit in next 1-2 months     Orders:    hydroCHLOROthiazide 12.5 mg tablet; Take 1 tablet (12.5 mg total) by mouth daily    metoprolol succinate (TOPROL-XL) 50 mg 24 hr tablet; Take 1 tablet (50 mg total) by mouth daily    Blood Pressure Monitoring (Comfort Touch BP Cuff/Medium) MISC; Use 1 actuation 2 (two) times a day

## 2025-04-04 NOTE — PROGRESS NOTES
Name: Linda Rosales      : 1965      MRN: [id number]  Encounter Provider: Triston Tyson MD  Encounter Date: 4/3/2025   Encounter department: [facility]  :  Assessment & Plan  Essential hypertension  BP elevated in office today. She has not been taking her medication consistently over the past few days. Has been well controlled on same regimen in the past. No symptoms today  - Rec'd home BP monitoring. Cuff prescribed, but she is wiling to purchase her own if not covered.   - Gave parameters for normal ranges and instructed her to take an additional 12.5mg hctz if her BP is consistently above 140/90.  - Instructed to call and inform the office if she makes this change   - Follow up on BP at annual wellness visit in next 1-2 months     Orders:    hydroCHLOROthiazide 12.5 mg tablet; Take 1 tablet (12.5 mg total) by mouth daily    metoprolol succinate (TOPROL-XL) 50 mg 24 hr tablet; Take 1 tablet (50 mg total) by mouth daily    Blood Pressure Monitoring (Comfort Touch BP Cuff/Medium) MISC; Use 1 actuation 2 (two) times a day    Encounter for screening colonoscopy  due for screening colonoscopy  - referral provided ahead of upcoming AWV    Orders:    Ambulatory referral to Gastroenterology; Future    Screening for diabetes mellitus  labs ordered ahead of annual wellness visit     Orders:    Hemoglobin A1C; Future    Screening for cardiovascular condition  labs ordered ahead of annual wellness visit    Orders:    Lipid Panel with Direct LDL reflex; Future    Comprehensive metabolic panel; Future    Screening mammogram for breast cancer  reminded she is due for mammogram screening  - active mammogram order in system                History of Present Illness   Hypertension  This is a chronic problem. The current episode started more than 1 year ago. The problem has been gradually worsening since onset. The problem is uncontrolled. Pertinent negatives include no chest  "pain, headaches or shortness of breath. There are no associated agents to hypertension. Risk factors for coronary artery disease include obesity. Past treatments include beta blockers and diuretics. The current treatment provides moderate improvement. Compliance problems: follow up adherence.      Review of Systems   Constitutional: Negative.    HENT: Negative.     Respiratory:  Negative for shortness of breath.    Cardiovascular:  Negative for chest pain.   Musculoskeletal: Negative.    Skin: Negative.    Neurological:  Negative for headaches.   Psychiatric/Behavioral: Negative.         Objective   /99 (BP Location: Left arm, Patient Position: Sitting, Cuff Size: Large)   Pulse 80   Temp 97.9 °F (36.6 °C)   Resp 16   Ht 5' 4\" (1.626 m)   Wt 114 kg (250 lb 12.8 oz)   SpO2 100%   BMI 43.05 kg/m²      Physical Exam  Vitals reviewed.   Constitutional:       General: She is not in acute distress.     Appearance: Normal appearance. She is well-developed. She is not ill-appearing.   HENT:      Head: Normocephalic and atraumatic.      Right Ear: External ear normal.      Left Ear: External ear normal.      Nose: Nose normal.      Mouth/Throat:      Mouth: Mucous membranes are moist.      Pharynx: Oropharynx is clear.   Eyes:      General: No scleral icterus.     Extraocular Movements: Extraocular movements intact.   Cardiovascular:      Rate and Rhythm: Normal rate and regular rhythm.      Heart sounds: Normal heart sounds. No murmur heard.  Pulmonary:      Effort: Pulmonary effort is normal. No respiratory distress.      Breath sounds: Normal breath sounds.   Abdominal:      General: There is no distension.   Musculoskeletal:         General: No signs of injury.      Cervical back: Normal range of motion.   Lymphadenopathy:      Cervical: No cervical adenopathy.   Skin:     Capillary Refill: Capillary refill takes less than 2 seconds.      Findings: No rash.   Neurological:      Mental Status: She is alert " and oriented to person, place, and time.      Sensory: No sensory deficit.      Motor: No weakness.      Gait: Gait normal.   Psychiatric:         Mood and Affect: Mood normal.         Behavior: Behavior normal.       Triston Tyson MD

## 2025-04-04 NOTE — ASSESSMENT & PLAN NOTE
due for screening colonoscopy  - referral provided ahead of upcoming AWV    Orders:    Ambulatory referral to Gastroenterology; Future

## 2025-04-04 NOTE — ASSESSMENT & PLAN NOTE
labs ordered ahead of annual wellness visit    Orders:    Lipid Panel with Direct LDL reflex; Future    Comprehensive metabolic panel; Future